# Patient Record
Sex: FEMALE | Race: WHITE | NOT HISPANIC OR LATINO | URBAN - METROPOLITAN AREA
[De-identification: names, ages, dates, MRNs, and addresses within clinical notes are randomized per-mention and may not be internally consistent; named-entity substitution may affect disease eponyms.]

---

## 2020-11-30 ENCOUNTER — INPATIENT (INPATIENT)
Facility: HOSPITAL | Age: 85
LOS: 2 days | Discharge: HOME | DRG: 86 | End: 2020-12-03
Attending: NEUROLOGICAL SURGERY | Admitting: NEUROLOGICAL SURGERY
Payer: MEDICARE

## 2020-11-30 VITALS
OXYGEN SATURATION: 97 % | HEIGHT: 62 IN | SYSTOLIC BLOOD PRESSURE: 142 MMHG | WEIGHT: 134.92 LBS | DIASTOLIC BLOOD PRESSURE: 89 MMHG | HEART RATE: 65 BPM | TEMPERATURE: 98 F | RESPIRATION RATE: 20 BRPM

## 2020-11-30 DIAGNOSIS — S06.5X9A TRAUMATIC SUBDURAL HEMORRHAGE WITH LOSS OF CONSCIOUSNESS OF UNSPECIFIED DURATION, INITIAL ENCOUNTER: ICD-10-CM

## 2020-11-30 LAB
ALBUMIN SERPL ELPH-MCNC: 3.8 G/DL — SIGNIFICANT CHANGE UP (ref 3.3–5.2)
ALP SERPL-CCNC: 94 U/L — SIGNIFICANT CHANGE UP (ref 40–120)
ALT FLD-CCNC: 24 U/L — SIGNIFICANT CHANGE UP
ANION GAP SERPL CALC-SCNC: 13 MMOL/L — SIGNIFICANT CHANGE UP (ref 5–17)
APTT BLD: 32.8 SEC — SIGNIFICANT CHANGE UP (ref 27.5–35.5)
APTT BLD: 34.1 SEC — SIGNIFICANT CHANGE UP (ref 27.5–35.5)
APTT BLD: 60.7 SEC — HIGH (ref 27.5–35.5)
AST SERPL-CCNC: 39 U/L — HIGH
BASOPHILS # BLD AUTO: 0.05 K/UL — SIGNIFICANT CHANGE UP (ref 0–0.2)
BASOPHILS NFR BLD AUTO: 0.9 % — SIGNIFICANT CHANGE UP (ref 0–2)
BILIRUB SERPL-MCNC: 0.5 MG/DL — SIGNIFICANT CHANGE UP (ref 0.4–2)
BLD GP AB SCN SERPL QL: SIGNIFICANT CHANGE UP
BLD GP AB SCN SERPL QL: SIGNIFICANT CHANGE UP
BUN SERPL-MCNC: 49 MG/DL — HIGH (ref 8–20)
CALCIUM SERPL-MCNC: 10.3 MG/DL — HIGH (ref 8.6–10.2)
CHLORIDE SERPL-SCNC: 105 MMOL/L — SIGNIFICANT CHANGE UP (ref 98–107)
CK SERPL-CCNC: 64 U/L — SIGNIFICANT CHANGE UP (ref 25–170)
CO2 SERPL-SCNC: 18 MMOL/L — LOW (ref 22–29)
CREAT SERPL-MCNC: 0.93 MG/DL — SIGNIFICANT CHANGE UP (ref 0.5–1.3)
EOSINOPHIL # BLD AUTO: 0.32 K/UL — SIGNIFICANT CHANGE UP (ref 0–0.5)
EOSINOPHIL NFR BLD AUTO: 6.1 % — HIGH (ref 0–6)
GIANT PLATELETS BLD QL SMEAR: PRESENT — SIGNIFICANT CHANGE UP
GLUCOSE BLDC GLUCOMTR-MCNC: 82 MG/DL — SIGNIFICANT CHANGE UP (ref 70–99)
GLUCOSE SERPL-MCNC: 91 MG/DL — SIGNIFICANT CHANGE UP (ref 70–99)
HCT VFR BLD CALC: 34.7 % — SIGNIFICANT CHANGE UP (ref 34.5–45)
HGB BLD-MCNC: 11.1 G/DL — LOW (ref 11.5–15.5)
INR BLD: 1.43 RATIO — HIGH (ref 0.88–1.16)
INR BLD: 1.47 RATIO — HIGH (ref 0.88–1.16)
INR BLD: 6.41 RATIO — CRITICAL HIGH (ref 0.88–1.16)
LYMPHOCYTES # BLD AUTO: 0.73 K/UL — LOW (ref 1–3.3)
LYMPHOCYTES # BLD AUTO: 13.9 % — SIGNIFICANT CHANGE UP (ref 13–44)
MANUAL SMEAR VERIFICATION: SIGNIFICANT CHANGE UP
MCHC RBC-ENTMCNC: 27.1 PG — SIGNIFICANT CHANGE UP (ref 27–34)
MCHC RBC-ENTMCNC: 32 GM/DL — SIGNIFICANT CHANGE UP (ref 32–36)
MCV RBC AUTO: 84.6 FL — SIGNIFICANT CHANGE UP (ref 80–100)
MONOCYTES # BLD AUTO: 0.46 K/UL — SIGNIFICANT CHANGE UP (ref 0–0.9)
MONOCYTES NFR BLD AUTO: 8.7 % — SIGNIFICANT CHANGE UP (ref 2–14)
NEUTROPHILS # BLD AUTO: 3.7 K/UL — SIGNIFICANT CHANGE UP (ref 1.8–7.4)
NEUTROPHILS NFR BLD AUTO: 70.4 % — SIGNIFICANT CHANGE UP (ref 43–77)
NRBC # BLD: 1 /100 — HIGH (ref 0–0)
PLAT MORPH BLD: NORMAL — SIGNIFICANT CHANGE UP
PLATELET # BLD AUTO: 121 K/UL — LOW (ref 150–400)
POTASSIUM SERPL-MCNC: 4.5 MMOL/L — SIGNIFICANT CHANGE UP (ref 3.5–5.3)
POTASSIUM SERPL-SCNC: 4.5 MMOL/L — SIGNIFICANT CHANGE UP (ref 3.5–5.3)
PROT SERPL-MCNC: 6.9 G/DL — SIGNIFICANT CHANGE UP (ref 6.6–8.7)
PROTHROM AB SERPL-ACNC: 16.3 SEC — HIGH (ref 10.6–13.6)
PROTHROM AB SERPL-ACNC: 16.7 SEC — HIGH (ref 10.6–13.6)
PROTHROM AB SERPL-ACNC: 68.1 SEC — HIGH (ref 10.6–13.6)
RAPID RVP RESULT: SIGNIFICANT CHANGE UP
RBC # BLD: 4.1 M/UL — SIGNIFICANT CHANGE UP (ref 3.8–5.2)
RBC # FLD: 16.7 % — HIGH (ref 10.3–14.5)
RBC BLD AUTO: NORMAL — SIGNIFICANT CHANGE UP
SARS-COV-2 RNA SPEC QL NAA+PROBE: SIGNIFICANT CHANGE UP
SODIUM SERPL-SCNC: 136 MMOL/L — SIGNIFICANT CHANGE UP (ref 135–145)
TROPONIN T SERPL-MCNC: 0.02 NG/ML — SIGNIFICANT CHANGE UP (ref 0–0.06)
WBC # BLD: 5.26 K/UL — SIGNIFICANT CHANGE UP (ref 3.8–10.5)
WBC # FLD AUTO: 5.26 K/UL — SIGNIFICANT CHANGE UP (ref 3.8–10.5)

## 2020-11-30 PROCEDURE — 93010 ELECTROCARDIOGRAM REPORT: CPT

## 2020-11-30 PROCEDURE — 71045 X-RAY EXAM CHEST 1 VIEW: CPT | Mod: 26

## 2020-11-30 PROCEDURE — 70450 CT HEAD/BRAIN W/O DYE: CPT | Mod: 26

## 2020-11-30 PROCEDURE — 93306 TTE W/DOPPLER COMPLETE: CPT | Mod: 26

## 2020-11-30 PROCEDURE — 72125 CT NECK SPINE W/O DYE: CPT | Mod: 26

## 2020-11-30 PROCEDURE — 70450 CT HEAD/BRAIN W/O DYE: CPT | Mod: 26,77

## 2020-11-30 PROCEDURE — 99222 1ST HOSP IP/OBS MODERATE 55: CPT

## 2020-11-30 PROCEDURE — 99291 CRITICAL CARE FIRST HOUR: CPT

## 2020-11-30 RX ORDER — AMLODIPINE BESYLATE 2.5 MG/1
1 TABLET ORAL
Qty: 0 | Refills: 0 | DISCHARGE

## 2020-11-30 RX ORDER — MONTELUKAST 4 MG/1
1 TABLET, CHEWABLE ORAL
Qty: 0 | Refills: 0 | DISCHARGE

## 2020-11-30 RX ORDER — ACETAMINOPHEN 500 MG
1000 TABLET ORAL ONCE
Refills: 0 | Status: COMPLETED | OUTPATIENT
Start: 2020-11-30 | End: 2020-11-30

## 2020-11-30 RX ORDER — PHYTONADIONE (VIT K1) 5 MG
10 TABLET ORAL ONCE
Refills: 0 | Status: COMPLETED | OUTPATIENT
Start: 2020-11-30 | End: 2020-11-30

## 2020-11-30 RX ORDER — TRAMADOL HYDROCHLORIDE 50 MG/1
25 TABLET ORAL EVERY 6 HOURS
Refills: 0 | Status: DISCONTINUED | OUTPATIENT
Start: 2020-11-30 | End: 2020-12-03

## 2020-11-30 RX ORDER — ACETAMINOPHEN 500 MG
650 TABLET ORAL EVERY 6 HOURS
Refills: 0 | Status: DISCONTINUED | OUTPATIENT
Start: 2020-11-30 | End: 2020-12-03

## 2020-11-30 RX ORDER — WARFARIN SODIUM 2.5 MG/1
1 TABLET ORAL
Qty: 0 | Refills: 0 | DISCHARGE

## 2020-11-30 RX ORDER — LISINOPRIL 2.5 MG/1
1 TABLET ORAL
Qty: 0 | Refills: 0 | DISCHARGE

## 2020-11-30 RX ORDER — METOPROLOL TARTRATE 50 MG
1 TABLET ORAL
Qty: 0 | Refills: 0 | DISCHARGE

## 2020-11-30 RX ORDER — ROSUVASTATIN CALCIUM 5 MG/1
1 TABLET ORAL
Qty: 0 | Refills: 0 | DISCHARGE

## 2020-11-30 RX ORDER — PROTHROMBIN COMPLEX CONCENTRATE (HUMAN) 25.5; 16.5; 24; 22; 22; 26 [IU]/ML; [IU]/ML; [IU]/ML; [IU]/ML; [IU]/ML; [IU]/ML
1500 POWDER, FOR SOLUTION INTRAVENOUS ONCE
Refills: 0 | Status: COMPLETED | OUTPATIENT
Start: 2020-11-30 | End: 2020-11-30

## 2020-11-30 RX ORDER — CHLORHEXIDINE GLUCONATE 213 G/1000ML
1 SOLUTION TOPICAL
Refills: 0 | Status: DISCONTINUED | OUTPATIENT
Start: 2020-11-30 | End: 2020-12-03

## 2020-11-30 RX ORDER — SODIUM CHLORIDE 9 MG/ML
1000 INJECTION INTRAMUSCULAR; INTRAVENOUS; SUBCUTANEOUS
Refills: 0 | Status: DISCONTINUED | OUTPATIENT
Start: 2020-11-30 | End: 2020-12-01

## 2020-11-30 RX ORDER — PANTOPRAZOLE SODIUM 20 MG/1
40 TABLET, DELAYED RELEASE ORAL DAILY
Refills: 0 | Status: DISCONTINUED | OUTPATIENT
Start: 2020-11-30 | End: 2020-12-01

## 2020-11-30 RX ORDER — LEVOTHYROXINE SODIUM 125 MCG
1 TABLET ORAL
Qty: 0 | Refills: 0 | DISCHARGE

## 2020-11-30 RX ORDER — ACETAMINOPHEN 500 MG
975 TABLET ORAL ONCE
Refills: 0 | Status: DISCONTINUED | OUTPATIENT
Start: 2020-11-30 | End: 2020-11-30

## 2020-11-30 RX ORDER — LEVETIRACETAM 250 MG/1
500 TABLET, FILM COATED ORAL EVERY 12 HOURS
Refills: 0 | Status: DISCONTINUED | OUTPATIENT
Start: 2020-11-30 | End: 2020-12-01

## 2020-11-30 RX ADMIN — CHLORHEXIDINE GLUCONATE 1 APPLICATION(S): 213 SOLUTION TOPICAL at 16:15

## 2020-11-30 RX ADMIN — TRAMADOL HYDROCHLORIDE 25 MILLIGRAM(S): 50 TABLET ORAL at 14:08

## 2020-11-30 RX ADMIN — Medication 1000 MILLIGRAM(S): at 12:05

## 2020-11-30 RX ADMIN — Medication 102 MILLIGRAM(S): at 13:18

## 2020-11-30 RX ADMIN — LEVETIRACETAM 420 MILLIGRAM(S): 250 TABLET, FILM COATED ORAL at 13:18

## 2020-11-30 RX ADMIN — PROTHROMBIN COMPLEX CONCENTRATE (HUMAN) 400 INTERNATIONAL UNIT(S): 25.5; 16.5; 24; 22; 22; 26 POWDER, FOR SOLUTION INTRAVENOUS at 11:12

## 2020-11-30 RX ADMIN — SODIUM CHLORIDE 50 MILLILITER(S): 9 INJECTION INTRAMUSCULAR; INTRAVENOUS; SUBCUTANEOUS at 16:15

## 2020-11-30 RX ADMIN — PANTOPRAZOLE SODIUM 40 MILLIGRAM(S): 20 TABLET, DELAYED RELEASE ORAL at 16:14

## 2020-11-30 RX ADMIN — Medication 400 MILLIGRAM(S): at 11:12

## 2020-11-30 RX ADMIN — TRAMADOL HYDROCHLORIDE 25 MILLIGRAM(S): 50 TABLET ORAL at 16:11

## 2020-11-30 RX ADMIN — PROTHROMBIN COMPLEX CONCENTRATE (HUMAN) 1500 INTERNATIONAL UNIT(S): 25.5; 16.5; 24; 22; 22; 26 POWDER, FOR SOLUTION INTRAVENOUS at 12:05

## 2020-11-30 NOTE — PHARMACOTHERAPY INTERVENTION NOTE - COMMENTS
Pt with INR 6.4, on warfarin. Given PCC 1,500 units for supratherapeutic INR. Recommended addition of phytonadione to prevent re-elevation of INR

## 2020-11-30 NOTE — H&P ADULT - NSHPREVIEWOFSYSTEMS_GEN_ALL_CORE
REVIEW OF SYSTEMS: [ ] Unable to Assess due to neurologic exam   [x ] All ROS addressed below are non-contributory, except:  Neuro: [x ] Headache [ ] Back pain [ ] Numbness [ ] Weakness [ ] Ataxia [ ] Dizziness [ ] Aphasia [ ] Dysarthria [ ] Visual disturbance  Resp: [ ] Shortness of breath/dyspnea, [ ] Orthopnea [ ] Cough  CV: [ ] Chest pain [ ] Palpitation [ ] Lightheadedness [ ] Syncope  Renal: [ ] Thirst [ ] Edema  GI: [ ] Nausea [ ] Emesis [ ] Abdominal pain [ ] Constipation [ ] Diarrhea  Hem: [ ] Hematemesis [ ] bright red blood per rectum  ID: [ ] Fever [ ] Chills [ ] Dysuria  ENT: [ ] Rhinorrhea

## 2020-11-30 NOTE — ED PROVIDER NOTE - OBJECTIVE STATEMENT
89 y/o F with hx of afib on coumadin presenting today after unwitnessed fall today at home. Pt states that she slipped and fell backwards, hitting the back of her head. Unknown loc, son at home heard the pt fall and came up immediately to find the pt conscious on the floor. Lac noted to back of head, bleeding controlled by EMS. Pt denies any chest pain, dyspnea, fevers, n/v/d, abdominal pain, dysuria, cough, congestion, sore throat, neck pain, back pain, weakness, numbness, tingling, dizziness, syncope, or other complaint. 89 y/o F with hx of HTN, HLD, and afib on coumadin presenting today after unwitnessed fall today at home. Pt states that she slipped and fell backwards, hitting the back of her head. Unknown loc, son at home heard the pt fall and came up immediately to find the pt conscious on the floor. Lac noted to back of head, bleeding controlled by EMS. Pt denies any chest pain, dyspnea, fevers, n/v/d, abdominal pain, dysuria, cough, congestion, sore throat, neck pain, back pain, weakness, numbness, tingling, dizziness, syncope, or other complaint.

## 2020-11-30 NOTE — PHARMACOTHERAPY INTERVENTION NOTE - COMMENTS
Called pharmacy to obtain medication list. Pt on warfarin 5 mG qday, confirmed last dose was 11/29 in the morning

## 2020-11-30 NOTE — CONSULT NOTE ADULT - SUBJECTIVE AND OBJECTIVE BOX
TRAUMA SERVICE - CONSULT NOTE  --------------------------------------------------------------------------------------------    TRAUMA ACTIVATION LEVEL: Consult    MECHANISM OF INJURY:      [x] Blunt  	[] MVC	[x] Fall	[] Pedestrian Struck	[] Motorcycle accident      [] Penetrating  	[] Gun Shot Wound 		[] Stab Wound    GCS:15 	E: 4	V: 5	M: 6    HPI: 89yo F with PMH of HTN, HLD, and A.fib on Coumadin presents after mechanical fall at home. Patient explains she was walking to other side of bed when she slipped and fell backwards hitting head. No loss of consciousness. Found by son who was at home and heard fall. She only complains of headache. No pain elsewhere in body. Denies fever, chest pain, shortness of breath, lightheadedness, dizziness, or changes in vision. Of note patient reported that her last INR check earlier this week was 5 and she had just stopped taking coumadin this morning.     Primary Survey:    A - airway intact  B - bilateral breath sounds and good chest rise  C - initial BP  BP: 127/54 (11-30-20 @ 19:00) *** , HR HR: 60 (11-30-20 @ 19:00) *** , palpable pulses in all extremities  D - GCS 15 on arrival      ROS: 10-system review is otherwise negative except HPI above.      PAST MEDICAL & SURGICAL HISTORY:    FAMILY HISTORY:    [x] Family history not pertinent as reviewed with the patient and family    SOCIAL HISTORY: Denies smoking, drinking alcohol, or use of illicit drugs.     ALLERGIES: No Known Allergies      HOME MEDICATIONS:   amLODIPine 5 mg oral tablet: 1 tab(s) orally once a day (30 Nov 2020 14:13)  hydroCHLOROthiazide 12.5 mg oral capsule: 1 cap(s) orally once a day (30 Nov 2020 14:13)  levothyroxine 50 mcg (0.05 mg) oral tablet: 1 tab(s) orally once a day (30 Nov 2020 14:13)  lisinopril 40 mg oral tablet: 1 tab(s) orally once a day (30 Nov 2020 14:13)  metoprolol succinate 50 mg oral tablet, extended release: 1 tab(s) orally once a day (30 Nov 2020 14:13)  montelukast 10 mg oral tablet: 1 tab(s) orally once a day (30 Nov 2020 14:13)  rosuvastatin 20 mg oral tablet: 1 tab(s) orally once a day (30 Nov 2020 14:13)  warfarin 5 mg oral tablet: 1 tab(s) orally once a day (30 Nov 2020 14:13)      CURRENT MEDICATIONS  MEDICATIONS (STANDING): levETIRAcetam  IVPB 500 milliGRAM(s) IV Intermittent every 12 hours  pantoprazole  Injectable 40 milliGRAM(s) IV Push daily  sodium chloride 0.9%. 1000 milliLiter(s) IV Continuous <Continuous>    MEDICATIONS (PRN):acetaminophen   Tablet .. 650 milliGRAM(s) Oral every 6 hours PRN Mild Pain (1 - 3)  traMADol 25 milliGRAM(s) Oral every 6 hours PRN Severe Pain (7 - 10)    --------------------------------------------------------------------------------------------    Vitals:   T(C): 36.4 (11-30-20 @ 16:00), Max: 36.8 (11-30-20 @ 09:57)  HR: 60 (11-30-20 @ 19:00) (55 - 70)  BP: 127/54 (11-30-20 @ 19:00) (121/60 - 142/89)  RR: 18 (11-30-20 @ 19:00) (16 - 20)  SpO2: 96% (11-30-20 @ 19:00) (96% - 100%)  CAPILLARY BLOOD GLUCOSE      POCT Blood Glucose.: 82 mg/dL (30 Nov 2020 19:12)    CAPILLARY BLOOD GLUCOSE      POCT Blood Glucose.: 82 mg/dL (30 Nov 2020 19:12)      11-30 @ 07:01  -  11-30 @ 21:11  --------------------------------------------------------  IN:    sodium chloride 0.9%: 200 mL  Total IN: 200 mL    OUT:  Total OUT: 0 mL    Total NET: 200 mL        Height (cm): 154.9 (11-30 @ 15:32)  Weight (kg): 64.1 (11-30 @ 15:32)  BMI (kg/m2): 26.7 (11-30 @ 15:32)  BSA (m2): 1.63 (11-30 @ 15:32)    PHYSICAL EXAM:   General: NAD, pleasant, follows commands, no focal deficits.   HEENT: Normocephalic, EOMI, Small 2 cm laceration on posterior scalp. No active bleeding, superficial and without need for repair. No facial tenderness. No blood in oral cavity.   Neck: Soft, midline trachea. No tenderness.   Chest: No chest wall tenderness.   Cardiac: S1, S2, RRR  Respiratory: unlabored, equal chest rise.   Abdomen: Soft, non-distended, non-tender.   Pelvis: Stable  Ext: palp radial b/l UE, b/l DP palp in Lower Extrem. No external lesions or signs of trauma.   Back: no TTP, no palpable runoff/stepoff/deformity      --------------------------------------------------------------------------------------------    LABS  CBC (11-30 @ 10:48)                              11.1<L>                         5.26    )----------------(  121<L>     70.4  % Neutrophils, 13.9  % Lymphocytes, ANC: 3.70                                34.7      BMP (11-30 @ 10:48)             136     |  105     |  49.0<H>		Ca++ --      Ca 10.3<H>             ---------------------------------( 91    		Mg --                 4.5     |  18.0<L>  |  0.93  			Ph --        LFTs (11-30 @ 10:48)      TPro 6.9 / Alb 3.8 / TBili 0.5 / DBili -- / AST 39<H> / ALT 24 / AlkPhos 94    Coags (11-30 @ 13:37)  aPTT 34.1 / INR 1.47<H> / PT 16.7<H>  Coags (11-30 @ 10:48)  aPTT 60.7<H> / INR 6.41<HH> / PT 68.1<H>    Cardiac Markers (11-30 @ 14:45)     HSTrop: -- / CKMB: -- / CK: 64        --------------------------------------------------------------------------------------------    IMAGING  CT Head and C-Spine:  IMPRESSION:    CT head: Acute parafalcine subdural hematoma measuring 9 mm, extending along the left tentorium. Acute bilateral frontal subdural hematomas measuring up to 5 mm. Scattered small left frontal parietal subarachnoid hemorrhage. No midline shift, hydrocephalus, or effacement of basal cisterns. No evidence of displaced calvarial fracture.    CT cervical spine:  1. No evidence for acute displaced fracture or malalignment.  2. Bilateral thyroid nodules measuring up to 2.3 cm on the right. Consider nonemergent thyroid ultrasound.  3. 5 mm left upper lobe nodule. Consider nonemergent CT chest.    These findings were discussed with Dr. Em at 11/30/2020 10:12 AM by Dr. Yasmani Ramirez with read back confirmation.      YASMANI RAMIREZ MD; Attending Radiologist  This document has been electronically signed. Nov 30 2020 10:36AM      --------------------------------------------------------------------------------------------

## 2020-11-30 NOTE — H&P ADULT - HISTORY OF PRESENT ILLNESS
89 y/o F with hx of HTN, HLD, and Afib on Coumadin presenting today after unwitnessed fall today at home. Pt states that she slipped and fell backwards, hitting the back of her head. Unknown loc, son at home heard the pt fall and came up immediately to find the pt conscious on the floor. Small Laceration noted to back of head, bleeding controlled by EMS. Currently patient complaining of Lt sided headache. Pt denies any chest pain, dyspnea, nausea/vomiting, neck pain, back pain, weakness, numbness, tingling, dizziness.

## 2020-11-30 NOTE — CONSULT NOTE ADULT - ATTENDING COMMENTS
".Discharge Instructions: After Your Surgery/Procedure  Youve just had surgery. During surgery you were given medicine called anesthesia to keep you relaxed and free of pain. After surgery you may have some pain or nausea. This is common. Here are some tips for feeling better and getting well after surgery.     Stay on schedule with your medication.   Going home  Your doctor or nurse will show you how to take care of yourself when you go home. He or she will also answer your questions. Have an adult family member or friend drive you home.      For your safety we recommend these precaution for the first 24 hours after your procedure:  · Do not drive or use heavy equipment.  · Do not make important decisions or sign legal papers.  · Do not drink alcohol.  · Have someone stay with you, if needed. He or she can watch for problems and help keep you safe.  · Your concentration, balance, coordination, and judgement may be impaired for many hours after anesthesia.  Use caution when ambulating or standing up.     · You may feel weak and "washed out" after anesthesia and surgery.      Subtle residual effects of general anesthesia or sedation with regional / local anesthesia can last more than 24 hours.  Rest for the remainder of the day or longer if your Doctor/Surgeon has advised you to do so.  Although you may feel normal within the first 24 hours, your reflexes and mental ability may be impaired without you realizing it.  You may feel dizzy, lightheaded or sleepy for 24 hours or longer.      Be sure to go to all follow-up visits with your doctor. And rest after your surgery for as long as your doctor tells you to.  Coping with pain  If you have pain after surgery, pain medicine will help you feel better. Take it as told, before pain becomes severe. Also, ask your doctor or pharmacist about other ways to control pain. This might be with heat, ice, or relaxation. And follow any other instructions your surgeon or nurse gives " you.  Tips for taking pain medicine  To get the best relief possible, remember these points:  · Pain medicines can upset your stomach. Taking them with a little food may help.  · Most pain relievers taken by mouth need at least 20 to 30 minutes to start to work.  · Taking medicine on a schedule can help you remember to take it. Try to time your medicine so that you can take it before starting an activity. This might be before you get dressed, go for a walk, or sit down for dinner.  · Constipation is a common side effect of pain medicines. Call your doctor before taking any medicines such as laxatives or stool softeners to help ease constipation. Also ask if you should skip any foods. Drinking lots of fluids and eating foods such as fruits and vegetables that are high in fiber can also help. Remember, do not take laxatives unless your surgeon has prescribed them.  · Drinking alcohol and taking pain medicine can cause dizziness and slow your breathing. It can even be deadly. Do not drink alcohol while taking pain medicine.  · Pain medicine can make you react more slowly to things. Do not drive or run machinery while taking pain medicine.  Your health care provider may tell you to take acetaminophen to help ease your pain. Ask him or her how much you are supposed to take each day. Acetaminophen or other pain relievers may interact with your prescription medicines or other over-the-counter (OTC) drugs. Some prescription medicines have acetaminophen and other ingredients. Using both prescription and OTC acetaminophen for pain can cause you to overdose. Read the labels on your OTC medicines with care. This will help you to clearly know the list of ingredients, how much to take, and any warnings. It may also help you not take too much acetaminophen. If you have questions or do not understand the information, ask your pharmacist or health care provider to explain it to you before you take the OTC medicine.  Managing  nausea  Some people have an upset stomach after surgery. This is often because of anesthesia, pain, or pain medicine, or the stress of surgery. These tips will help you handle nausea and eat healthy foods as you get better. If you were on a special food plan before surgery, ask your doctor if you should follow it while you get better. These tips may help:  · Do not push yourself to eat. Your body will tell you when to eat and how much.  · Start off with clear liquids and soup. They are easier to digest.  · Next try semi-solid foods, such as mashed potatoes, applesauce, and gelatin, as you feel ready.  · Slowly move to solid foods. Dont eat fatty, rich, or spicy foods at first.  · Do not force yourself to have 3 large meals a day. Instead eat smaller amounts more often.  · Take pain medicines with a small amount of solid food, such as crackers or toast, to avoid nausea.     Call your surgeon if  · You still have pain an hour after taking medicine. The medicine may not be strong enough.  · You feel too sleepy, dizzy, or groggy. The medicine may be too strong.  · You have side effects like nausea, vomiting, or skin changes, such as rash, itching, or hives.       If you have obstructive sleep apnea  You were given anesthesia medicine during surgery to keep you comfortable and free of pain. After surgery, you may have more apnea spells because of this medicine and other medicines you were given. The spells may last longer than usual.   At home:  · Keep using the continuous positive airway pressure (CPAP) device when you sleep. Unless your health care provider tells you not to, use it when you sleep, day or night. CPAP is a common device used to treat obstructive sleep apnea.  · Talk with your provider before taking any pain medicine, muscle relaxants, or sedatives. Your provider will tell you about the possible dangers of taking these medicines.  © 5734-5873 [employer]. [address] [place] [address]  [place] [address]. All rights reserved. This information is not intended as a substitute for professional medical care. Always follow your healthcare professional's instructions.     NSGY Attg:    see above    patient seen and examined by PA staff    imaging reviewed    agree with exam and plan as above

## 2020-11-30 NOTE — H&P ADULT - NSHPLABSRESULTS_GEN_ALL_CORE
LABS:  Na: 136 (11-30 @ 10:48)  K: 4.5 (11-30 @ 10:48)  Cl: 105 (11-30 @ 10:48)  CO2: 18.0 (11-30 @ 10:48)  BUN: 49.0 (11-30 @ 10:48)  Cr: 0.93 (11-30 @ 10:48)  Glu: 91(11-30 @ 10:48)    Hgb: 11.1 (11-30 @ 10:48)  Hct: 34.7 (11-30 @ 10:48)  WBC: 5.26 (11-30 @ 10:48)  Plt: 121 (11-30 @ 10:48)    INR: 6.41 11-30-20 @ 10:48  PTT: 60.7 11-30-20 @ 10:48    CT cervical spine:    Some images are degraded by motion.    Anterolisthesis at C3-C4, C7-T1, and T1-T2. Vertebral body heights are within normal limits. Nonfused posterior C1 arch. Multilevel intervertebral disc height loss, degenerative endplate changes, disc osteophyte complexes, and facet and uncovertebral arthropathy, contributing to spinal canal and neuroforaminal narrowing.    There is no prevertebral soft tissue swelling. Calcified plaque in the aortic arch and at the bilateral carotid bifurcations. Bilateral thyroid nodules measuring up to 2.3 cm on the right. Biapical scarring. 5 mm left upper lobe nodule (3:156).      IMPRESSION:    CT head: Acute parafalcine subdural hematoma measuring 9 mm, extending along the left tentorium. Acute bilateral frontal subdural hematomas measuring up to 5 mm. Scattered small left frontal parietal subarachnoid hemorrhage. No midline shift, hydrocephalus, or effacement of basal cisterns. No evidence of displaced calvarial fracture.    CT cervical spine:  1.  No evidence for acute displaced fracture or malalignment.  2.  Bilateral thyroid nodules measuring up to 2.3 cm on the right. Consider nonemergent thyroid ultrasound.  3.  5 mm left upper lobe nodule. Consider nonemergent CT chest.

## 2020-11-30 NOTE — ED PROVIDER NOTE - PHYSICAL EXAMINATION
Gen: no acute distress  Head: normocephalic, 2cm lac noted to posterior skull, no active bleeding  Lung: CTAB, no respiratory distress, no wheezing, rales, rhonchi  CV: normal s1/s2, rrr,   Abd: soft, non-tender, non-distended  MSK: No edema, no visible deformities, full range of motion in all 4 extremities  Neuro: No focal neurologic deficits  Skin: No rash   Psych: normal affect

## 2020-11-30 NOTE — CONSULT NOTE ADULT - ATTENDING COMMENTS
The patient was seen and examined  Details per the resident's consult note  This is a 89-year old woman who sustained a ground level fall  The patient was worked up by the ED and found to have an ICH  No other injuries identified  The patient was supratherapeutic from warfarin    CXR:  No PTX/ZAIN  PXR:  No fracture    Impression:  S/P fall  ICH  Coagulopathy    Plan:  Correct coagulopathy - PCC  Neurosurgery

## 2020-11-30 NOTE — ED PROVIDER NOTE - ATTENDING CONTRIBUTION TO CARE
I personally saw the patient with the resident, and completed the key components of the history and physical exam. I then discussed the management plan with the resident.      s/p fall on coumadin with head injury; denies loc; pt with bleeding from occiput of head; denies any neck pain; pe awake alert in nad heent- dresssing with dry blood; superficial abrasion of head; no stepoff; neck supple with collar ; no midline tenderness chest nontender  s1 s2 lungs clear abd soft nontender neuro nonfocal  dx head injury; ct head, trauma consult; neurosurg consult;

## 2020-11-30 NOTE — ED PROVIDER NOTE - PROGRESS NOTE DETAILS
CT reads noted. Trauma cx and nsx consults placed. - Murray Em, PGY-2 Nsx down to evaluate pt, requesting admit to NSICU. Reviewed all results with pt as well as plans for admission. Pt is comfortable with plan for admission. Questions answered. - Murray Em, PGY-2

## 2020-11-30 NOTE — CONSULT NOTE ADULT - SUBJECTIVE AND OBJECTIVE BOX
89 y/o F with hx of HTN, HLD, and Afib on Coumadin presenting today after unwitnessed fall today at home. Pt states that she slipped and fell backwards, hitting the back of her head. Unknown loc, son at home heard the pt fall and came up immediately to find the pt conscious on the floor. Small Laceration noted to back of head, bleeding controlled by EMS. Currently patient complaining of Lt sided headache. Pt denies any chest pain, dyspnea, nausea/vomiting, neck pain, back pain, weakness, numbness, tingling, dizziness.     VITAL SIGNS:  T(C): 36.7 (30 Nov 2020 11:12), Max: 36.8 (30 Nov 2020 09:57)  T(F): 98 (30 Nov 2020 11:12), Max: 98.2 (30 Nov 2020 09:57)  HR: 68 (30 Nov 2020 11:12) (65 - 70)  BP: 138/70 (30 Nov 2020 11:12) (138/70 - 142/89)  BP(mean): --  RR: 16 (30 Nov 2020 11:12) (16 - 20)  SpO2: 100% (30 Nov 2020 11:12) (97% - 100%)    ALLERGIES:  No Known Allergies      LABS:                     11.1   5.26  )-----------( 121      ( 30 Nov 2020 10:48 )             34.7       11-30    136  |  105  |  49.0<H>  ----------------------------<  91  4.5   |  18.0<L>  |  0.93    Ca    10.3<H>      30 Nov 2020 10:48    TPro  6.9  /  Alb  3.8  /  TBili  0.5  /  DBili  x   /  AST  39<H>  /  ALT  24  /  AlkPhos  94  11-30    PHYSICAL EXAM:   Patient seen and examined in ED. In NAD, complaining of Left sided headache.  	Head: normocephalic, 2cm lac noted to posterior skull, no active bleeding  	Neuro: Awake, alert and oriented to self and place- not date.  +[place].  Speech clear. Pupils 3mm reactive B/L.  EOMI.  No facial noted.  Follows simple commands, ZARATE 5/5.  No drift noted.   	Lung: CTAB, no respiratory distress, no wheezing, rales, rhonchi  	CV: normal s1/s2, rrr,   	Abd: soft, non-tender, non-distended    RADIOLOGY:  CT Head No Cont (11.30.20 @ 10:17)  CT head: Acute parafalcine subdural hematoma measuring 9 mm, extending along the left tentorium. Acute bilateral frontal subdural hematomas measuring up to 5 mm. Scattered small left frontal parietal subarachnoid hemorrhage. No midline shift, hydrocephalus, or effacement of basal cisterns. No evidence of displaced calvarial fracture.    CT cervical spine:  1.  No evidence for acute displaced fracture or malalignment.  2.  Bilateral thyroid nodules measuring up to 2.3 cm on the right. Consider nonemergent thyroid ultrasound.

## 2020-11-30 NOTE — H&P ADULT - ASSESSMENT
A/P:  89 year ole woman tripped and hit her head, presenting with acute   CT head: Acute parafalcine subdural hematoma measuring 9 mm, extending along the left tentorium. Acute bilateral frontal subdural hematomas measuring up to 5 mm. Scattered small left frontal parietal subarachnoid hemorrhage  while on coumadin, INR  6.2    Neuro: neuro checks q 1 hr, CT head IN 6 HOURS , keppra 500 mg BID for seizure prophylaxis,  CERVICAL COLLAR CLEARED clinically and by CT head  received 1500 PCC , which is likely underdosed per weight and pre-PCC INR, if INR now is high, will give another 25548 PCC  vIT k 10 MG IV once  INR q 6 hrs   Respiratory: RA  CV: Atrial fibrillation, will resume her rate control medication, TTE, -140 mmhg  Endocrine: finger sticks q6hrs, ISS , keep sugar 120-180 mmhg   Heme/Onc: INR <1.5, Plt>100 , s/p PCC, INR q 6 hrs for 24 hrs           DVT ppx: SCD, contraindicated to start anticoagulant as she is PBD0   Renal: NS 75 ml/hr  ID: afebrile  GI: NPO, protonix, NG, colace   Social/Family: updated at bedside  Discharge planning: ICU    Code Status: [x] Full Code [] DNR [] DNI [] Goals of Care:   Disposition: [x] ICU [] Stroke Unit [] RCU []PCU []Floor [] Discharge Home     Patient at high risk for neurologic deterioration, seizures, critical care time, excluding procedures: 40 minutes  Patient condition is critical, she has very poor exam.     40 minutes critical time  patient is at increased risk of hematoma expansion and death

## 2020-11-30 NOTE — ED ADULT NURSE NOTE - OBJECTIVE STATEMENT
pt alert oriented lives alone pt got up and walked around and slipped on floor fell back and hit head on floor denies LOC but c/o headache per pt remembers whole thing pt niece was there on way to leaving to go to work called EMS [place]  per pt nephew lives downstairs in house

## 2020-11-30 NOTE — ED ADULT NURSE NOTE - NSIMPLEMENTINTERV_GEN_ALL_ED
Implemented All Universal Safety Interventions:  [place] to call system. Call bell, personal items and telephone within reach. Instruct patient to call for assistance. Room bathroom lighting operational. Non-slip footwear when patient is off stretcher. Physically safe environment: no spills, clutter or unnecessary equipment. Stretcher in lowest position, wheels locked, appropriate side rails in place.

## 2020-11-30 NOTE — ED PROVIDER NOTE - CLINICAL SUMMARY MEDICAL DECISION MAKING FREE TEXT BOX
Pt presenting as fall from home w/ +head injury on coumadin. Priority CT head/c-spine ordered. WIll order labs, t/s, coags, cxr, reeval.

## 2020-11-30 NOTE — H&P ADULT - NSHPPHYSICALEXAM_GEN_ALL_CORE
GA: NAD  Scalp: small scalp laceration   HEENT: atraumatic  Neuro: BRANDI, intact EOM, awake, alert, oriented x 3, no cervical tenderness on palpation and on full range of motion, muscle power 5/5 all over except slightly weaker on the left side   Heart: irregular, [id number]   lungs: good breath sounds bilaterally  Abd: soft, nontender  ext: no edema

## 2020-11-30 NOTE — ED ADULT TRIAGE NOTE - CHIEF COMPLAINT QUOTE
Mechanical fall at home, hitting back of head onto floor, laceration noted to back of head, wrapped with gauze. pt denies LOC, on coumadin, MD hill at bedside, priority CT called.

## 2020-11-30 NOTE — CONSULT NOTE ADULT - ASSESSMENT
Patient is a 89y old f with HTN and A.fib on coumadin who presents s/p mechanical fall with positive head strike and minor laceration to posterior scalp as well as a supra-therapeutic  INR. Found on CT with L parafalcine and b/l frontal SDH, and scattered small left SAH. Posterior scalp laceration without bleeding or need for repair. She denies pain elsewhere in body and physical exam without findings of other injuries. Hemodynamically stable and neurologically intact.     SDH / SAH  - Neurologically intact.  - Admit to Neurosurgery / Neurosx ICU    Posterior Scalp Lac  - Without bleeding, superficial, No need for suture or staple repair.   - Consider bacitracin     S/p Mechanical Fall  - No other injuries requiring general surgical intervention identified  - Tertiary trauma exam in AM      Plan discussed with Dr. Draper who agrees.

## 2020-12-01 LAB
A1C WITH ESTIMATED AVERAGE GLUCOSE RESULT: 5.4 % — SIGNIFICANT CHANGE UP (ref 4–5.6)
ANION GAP SERPL CALC-SCNC: 11 MMOL/L — SIGNIFICANT CHANGE UP (ref 5–17)
APTT BLD: 31.8 SEC — SIGNIFICANT CHANGE UP (ref 27.5–35.5)
BUN SERPL-MCNC: 34 MG/DL — HIGH (ref 8–20)
CALCIUM SERPL-MCNC: 9.5 MG/DL — SIGNIFICANT CHANGE UP (ref 8.6–10.2)
CHLORIDE SERPL-SCNC: 110 MMOL/L — HIGH (ref 98–107)
CHOLEST SERPL-MCNC: 125 MG/DL — SIGNIFICANT CHANGE UP
CO2 SERPL-SCNC: 18 MMOL/L — LOW (ref 22–29)
CREAT SERPL-MCNC: 0.74 MG/DL — SIGNIFICANT CHANGE UP (ref 0.5–1.3)
ESTIMATED AVERAGE GLUCOSE: 108 MG/DL — SIGNIFICANT CHANGE UP (ref 68–114)
GLUCOSE SERPL-MCNC: 82 MG/DL — SIGNIFICANT CHANGE UP (ref 70–99)
HCT VFR BLD CALC: 31.9 % — LOW (ref 34.5–45)
HDLC SERPL-MCNC: 56 MG/DL — SIGNIFICANT CHANGE UP
HGB BLD-MCNC: 10.3 G/DL — LOW (ref 11.5–15.5)
INR BLD: 1.25 RATIO — HIGH (ref 0.88–1.16)
LIPID PNL WITH DIRECT LDL SERPL: 56 MG/DL — SIGNIFICANT CHANGE UP
MAGNESIUM SERPL-MCNC: 1.8 MG/DL — SIGNIFICANT CHANGE UP (ref 1.6–2.6)
MCHC RBC-ENTMCNC: 28 PG — SIGNIFICANT CHANGE UP (ref 27–34)
MCHC RBC-ENTMCNC: 32.3 GM/DL — SIGNIFICANT CHANGE UP (ref 32–36)
MCV RBC AUTO: 86.7 FL — SIGNIFICANT CHANGE UP (ref 80–100)
NON HDL CHOLESTEROL: 69 MG/DL — SIGNIFICANT CHANGE UP
PHOSPHATE SERPL-MCNC: 3 MG/DL — SIGNIFICANT CHANGE UP (ref 2.4–4.7)
PLATELET # BLD AUTO: 105 K/UL — LOW (ref 150–400)
POTASSIUM SERPL-MCNC: 4.1 MMOL/L — SIGNIFICANT CHANGE UP (ref 3.5–5.3)
POTASSIUM SERPL-SCNC: 4.1 MMOL/L — SIGNIFICANT CHANGE UP (ref 3.5–5.3)
PROTHROM AB SERPL-ACNC: 14.4 SEC — HIGH (ref 10.6–13.6)
RBC # BLD: 3.68 M/UL — LOW (ref 3.8–5.2)
RBC # FLD: 17 % — HIGH (ref 10.3–14.5)
SARS-COV-2 IGG SERPL QL IA: NEGATIVE — SIGNIFICANT CHANGE UP
SARS-COV-2 IGM SERPL IA-ACNC: 0.13 INDEX — SIGNIFICANT CHANGE UP
SODIUM SERPL-SCNC: 139 MMOL/L — SIGNIFICANT CHANGE UP (ref 135–145)
TRIGL SERPL-MCNC: 64 MG/DL — SIGNIFICANT CHANGE UP
TSH SERPL-MCNC: 4.63 UIU/ML — HIGH (ref 0.27–4.2)
WBC # BLD: 5.87 K/UL — SIGNIFICANT CHANGE UP (ref 3.8–10.5)
WBC # FLD AUTO: 5.87 K/UL — SIGNIFICANT CHANGE UP (ref 3.8–10.5)

## 2020-12-01 PROCEDURE — 99231 SBSQ HOSP IP/OBS SF/LOW 25: CPT

## 2020-12-01 PROCEDURE — 99232 SBSQ HOSP IP/OBS MODERATE 35: CPT

## 2020-12-01 PROCEDURE — 70450 CT HEAD/BRAIN W/O DYE: CPT | Mod: 26

## 2020-12-01 PROCEDURE — 99233 SBSQ HOSP IP/OBS HIGH 50: CPT

## 2020-12-01 RX ORDER — LEVETIRACETAM 250 MG/1
500 TABLET, FILM COATED ORAL
Refills: 0 | Status: DISCONTINUED | OUTPATIENT
Start: 2020-12-01 | End: 2020-12-03

## 2020-12-01 RX ORDER — AMLODIPINE BESYLATE 2.5 MG/1
5 TABLET ORAL DAILY
Refills: 0 | Status: DISCONTINUED | OUTPATIENT
Start: 2020-12-01 | End: 2020-12-03

## 2020-12-01 RX ORDER — POLYETHYLENE GLYCOL 3350 17 G/17G
17 POWDER, FOR SOLUTION ORAL DAILY
Refills: 0 | Status: DISCONTINUED | OUTPATIENT
Start: 2020-12-01 | End: 2020-12-03

## 2020-12-01 RX ORDER — METOPROLOL TARTRATE 50 MG
25 TABLET ORAL
Refills: 0 | Status: DISCONTINUED | OUTPATIENT
Start: 2020-12-01 | End: 2020-12-03

## 2020-12-01 RX ORDER — ATORVASTATIN CALCIUM 80 MG/1
80 TABLET, FILM COATED ORAL AT BEDTIME
Refills: 0 | Status: DISCONTINUED | OUTPATIENT
Start: 2020-12-01 | End: 2020-12-03

## 2020-12-01 RX ORDER — MAGNESIUM SULFATE 500 MG/ML
2 VIAL (ML) INJECTION ONCE
Refills: 0 | Status: COMPLETED | OUTPATIENT
Start: 2020-12-01 | End: 2020-12-01

## 2020-12-01 RX ORDER — INFLUENZA VIRUS VACCINE 15; 15; 15; 15 UG/.5ML; UG/.5ML; UG/.5ML; UG/.5ML
0.5 SUSPENSION INTRAMUSCULAR ONCE
Refills: 0 | Status: DISCONTINUED | OUTPATIENT
Start: 2020-12-01 | End: 2020-12-03

## 2020-12-01 RX ORDER — LISINOPRIL 2.5 MG/1
40 TABLET ORAL DAILY
Refills: 0 | Status: DISCONTINUED | OUTPATIENT
Start: 2020-12-01 | End: 2020-12-01

## 2020-12-01 RX ORDER — LEVOTHYROXINE SODIUM 125 MCG
50 TABLET ORAL DAILY
Refills: 0 | Status: DISCONTINUED | OUTPATIENT
Start: 2020-12-01 | End: 2020-12-03

## 2020-12-01 RX ORDER — MONTELUKAST 4 MG/1
10 TABLET, CHEWABLE ORAL DAILY
Refills: 0 | Status: DISCONTINUED | OUTPATIENT
Start: 2020-12-01 | End: 2020-12-03

## 2020-12-01 RX ORDER — SENNA PLUS 8.6 MG/1
2 TABLET ORAL AT BEDTIME
Refills: 0 | Status: DISCONTINUED | OUTPATIENT
Start: 2020-12-01 | End: 2020-12-03

## 2020-12-01 RX ADMIN — ATORVASTATIN CALCIUM 80 MILLIGRAM(S): 80 TABLET, FILM COATED ORAL at 21:56

## 2020-12-01 RX ADMIN — Medication 650 MILLIGRAM(S): at 07:15

## 2020-12-01 RX ADMIN — SENNA PLUS 2 TABLET(S): 8.6 TABLET ORAL at 21:56

## 2020-12-01 RX ADMIN — LEVETIRACETAM 500 MILLIGRAM(S): 250 TABLET, FILM COATED ORAL at 17:28

## 2020-12-01 RX ADMIN — Medication 650 MILLIGRAM(S): at 18:35

## 2020-12-01 RX ADMIN — AMLODIPINE BESYLATE 5 MILLIGRAM(S): 2.5 TABLET ORAL at 17:28

## 2020-12-01 RX ADMIN — MONTELUKAST 10 MILLIGRAM(S): 4 TABLET, CHEWABLE ORAL at 17:28

## 2020-12-01 RX ADMIN — Medication 50 GRAM(S): at 06:39

## 2020-12-01 RX ADMIN — Medication 650 MILLIGRAM(S): at 17:57

## 2020-12-01 RX ADMIN — LEVETIRACETAM 420 MILLIGRAM(S): 250 TABLET, FILM COATED ORAL at 05:14

## 2020-12-01 RX ADMIN — CHLORHEXIDINE GLUCONATE 1 APPLICATION(S): 213 SOLUTION TOPICAL at 05:15

## 2020-12-01 RX ADMIN — Medication 25 MILLIGRAM(S): at 17:28

## 2020-12-01 RX ADMIN — Medication 650 MILLIGRAM(S): at 06:43

## 2020-12-01 NOTE — PROGRESS NOTE ADULT - SUBJECTIVE AND OBJECTIVE BOX
INTERVAL HPI/OVERNIGHT EVENTS:  89 year old Female w/ PMHX of HTN, HLD, and Afib on Coumadin presenting 11/30/2020 after unwitnessed fall today at home. Pt states that she slipped and fell backwards, hitting the back of her head, ?LOC. Son at home heard the pt fall and came up immediately to find the pt conscious on the floor. Small Laceration noted to posterior aspect of head, bleeding controlled by EMS. CTH revealed acute parafalcine SDH. INR 6.4, given VitK and Kcentra. Repeat CTH showed increased component along left tentorial leaflet. 3rd CTH unchanged.   Patient seen and examined this morning with neurosurgical and neuroICU team.    Vital Signs Last 24 Hrs  T(C): 36.8 (01 Dec 2020 19:07), Max: 36.9 (01 Dec 2020 08:00)  T(F): 98.3 (01 Dec 2020 19:07), Max: 98.4 (01 Dec 2020 08:00)  HR: 68 (01 Dec 2020 19:00) (53 - 81)  BP: 129/57 (01 Dec 2020 19:00) (93/48 - 143/58)  BP(mean): 77 (01 Dec 2020 19:00) (63 - 101)  RR: 19 (01 Dec 2020 19:00) (14 - 29)  SpO2: 98% (01 Dec 2020 19:00) (95% - 100%)    PHYSICAL EXAM:  GENERAL: NAD, well-groomed, well-developed  HEAD: Scalp laceration, no signs of active bleeding  MARYAN COMA SCORE: E-4 V-5 M-6 = 15  MENTAL STATUS: AAO x3; Awake; Opens eyes spontaneously. Appropriately conversant without aphasia. following simple commands  CRANIAL NERVES: Visual acuity normal for age, PERRL. EOMI without nystagmus. Facial sensation intact V1-3 distribution b/l. Face symmetric w/ normal eye closure and smile, tongue midline. Hearing grossly intact. Speech clear.   MOTOR: Full ROM. ZARATE x4; Mild left sided weakness  SENSATION: grossly intact to light touch all extremities  CHEST/LUNG: Nonlabored breaths, CTA b/l.   HEART: +S1/+S2    LABS:             10.3   5.87  )-----------( 105      ( 01 Dec 2020 04:19 )             31.9     12-01    139  |  110<H>  |  34.0<H>  ----------------------------<  82  4.1   |  18.0<L>  |  0.74    Ca    9.5      01 Dec 2020 04:19  Phos  3.0     12-01  Mg     1.8     12-01    TPro  6.9  /  Alb  3.8  /  TBili  0.5  /  DBili  x   /  AST  39<H>  /  ALT  24  /  AlkPhos  94  11-30    PT/INR - ( 01 Dec 2020 04:19 )   PT: 14.4 sec;   INR: 1.25 ratio         PTT - ( 01 Dec 2020 04:19 )  PTT:31.8 sec      11-30 @ 07:01  -  12-01 @ 07:00  --------------------------------------------------------  IN: 800 mL / OUT: 50 mL / NET: 750 mL    12-01 @ 07:01  -  12-01 @ 19:51  --------------------------------------------------------  IN: 1250 mL / OUT: 800 mL / NET: 450 mL        RADIOLOGY & ADDITIONAL TESTS:  CT Head No Cont (12.01.20 @ 06:20)   IMPRESSION:  Compared to the previous examination, the subdural and subarachnoid hemorrhage is stable. No new hemorrhage has developed.              CAPRINI SCORE [CLOT]:  Patient has an estimated Caprini score of greater than 5.  However, the patient's unique clinical situation will be addressed in an individual manner to determine appropriate anticoagulation treatment, if any.

## 2020-12-01 NOTE — OCCUPATIONAL THERAPY INITIAL EVALUATION ADULT - PLANNED THERAPY INTERVENTIONS, OT EVAL
strengthening/transfer training/toilet/parent/caregiver training.../ADL retraining/balance training/bed mobility training

## 2020-12-01 NOTE — PROGRESS NOTE ADULT - ASSESSMENT
Pt is a 89 yo F with A fib on Coumadin, HTN and HLD presented on 11/30/2020 after unwitnessed fall  at home. Pt states that she slipped and fell backwards, hitting the back of her head. Unknown LOC.  Son at home heard the pt fall and came up immediately to find the pt conscious on the floor. Small Laceration noted to back of head, bleeding controlled by EMS. CT head revealed Acute parafalcine subdural hematoma measuring 9 mm, extending along the left tentorium. Acute bilateral frontal subdural hematomas measuring up to 5 mm. Scattered small left frontal parietal subarachnoid hemorrhage. No midline shift, hydrocephalus, or effacement of basal cisterns. No evidence of displaced calvarial fracture. INR was 6.4 and pt was Rx'd with Vit K + Kcentra. 2nd repeat CT head: increased component along the left tentorial leaflet is likely due to redistribution of blood products. 3rd repeat CT head unchanged. ICU admitting dx: TBI s/p fall in setting of coagulopathy 2 to Coumadin    CVS: Cont CCB and BB for BP control  Heme: No NSAIDS or Heparin for now/ Since pt is a fall risk will need to reconsider risk vs benefit of AC for A fib  FEN: Avoid hyponatremia/ Po diet  Neuro: Cont neurochecks q 2hrs/ Keppra for Sz prophylaxis x7 days/ Repeat CT head tomorrow if stable may transfer out of ICU/ F/u as per Neurosurg  OOB->chair/ PT/OT

## 2020-12-01 NOTE — PHYSICAL THERAPY INITIAL EVALUATION ADULT - ADDITIONAL COMMENTS
per patient, she lives alone for the most part. Her nephew lives downstairs but cannot help. She lives upstairs ~6-8 steps with a handrail. pt reports that she rarely needs to negotiate the steps, because she stays in her living area most of the time. Pt uses a SAC at baseline, and owns a RW.

## 2020-12-01 NOTE — OCCUPATIONAL THERAPY INITIAL EVALUATION ADULT - PERTINENT HX OF CURRENT PROBLEM, REHAB EVAL
Pt presents to ED s/p fall, hitting back of head. Head CT with acute parafalcine subdural hematoma measuring 9mm, extending along the left tentorium; acute bilateral frontal subdural hematomas measuring up to 5mm; scattered small left frontal parietal subarachnoid hemorrhage; no midline shift, hydrocephalus, or effacement of basal cisterns; no evidence of displaced calvarial fracture. Pt also with posterior scalp laceration.

## 2020-12-01 NOTE — OCCUPATIONAL THERAPY INITIAL EVALUATION ADULT - SPECIAL TRAINING, OT EVAL
Functional mobility for a couple feet with handheld assistance due to decreased strength and decreased balance; cues for foot placement, upright postural control and weightshifting. Pt requires increased time and verbal/tactile cues throughout mobility for safety.

## 2020-12-01 NOTE — CHART NOTE - NSCHARTNOTEFT_GEN_A_CORE
Tertiary Trauma Survey (TTS)    Date of TTS: 12-01-20 @ 06:36                             Admit Date: 11-30-20 @ 11:45      Trauma Activation:      Subjective / 24 hour events:  Patient evaluated at bedside, no acute distress. Patient reports no pain besides a headache. Patient denies chest pain, SOB, fevers, or chills.     Vital Signs Last 24 Hrs  T(C): 36.6 (01 Dec 2020 04:27), Max: 36.8 (30 Nov 2020 09:57)  T(F): 97.9 (01 Dec 2020 04:27), Max: 98.2 (30 Nov 2020 09:57)  HR: 66 (01 Dec 2020 06:00) (53 - 70)  BP: 143/58 (01 Dec 2020 05:00) (93/48 - 143/58)  BP(mean): 82 (01 Dec 2020 05:00) (63 - 91)  RR: 23 (01 Dec 2020 06:00) (14 - 23)  SpO2: 100% (01 Dec 2020 06:00) (95% - 100%)    Physical Exam:    Neuro: [x ] non focal neurological exam [ ] Focal Neurological deficits noted to be:     HEENT: [x ] Normo-cephalic/atraumatic  [ ] abnormalities noted to be:    Pulm/Chest:  [x ] CTA b/l  [x ] chest wall non tender  [ ] abnormalities noted to be:    Cardiac: [x ] S1S2, sinus rhythm  [ ] abnormalities noted to be:     GI / Abdomen: [x ] Soft, non-tender, non-distended [ ] abnormalities noted to be:    Musculoskeletal / Extremities: [x ]normal active ROM  [ ]  abnormalities noted to be:    Integumentary: [x ] Skin intact [x ] Warm [x ] Dry [ ]abnormalities noted to be:    Vascular: [ x] 2+ palpable distal pulses  [ ] JASON:       [ ] abnormalities noted to be:      List Injuries Identified to Date: No traumatic injuries noted    CT head: Subdural hematoma  Chest X-ray with no traumatic injuries    Patient Tertiary Trauma Survey (TTS)    Date of TTS: 12-01-20 @ 06:36                             Admit Date: 11-30-20 @ 11:45      Trauma Activation:      Subjective / 24 hour events:  Patient evaluated at bedside, no acute distress. Patient reports no pain besides a headache. Patient denies chest pain, SOB, fevers, or chills.     Vital Signs Last 24 Hrs  T(C): 36.6 (01 Dec 2020 04:27), Max: 36.8 (30 Nov 2020 09:57)  T(F): 97.9 (01 Dec 2020 04:27), Max: 98.2 (30 Nov 2020 09:57)  HR: 66 (01 Dec 2020 06:00) (53 - 70)  BP: 143/58 (01 Dec 2020 05:00) (93/48 - 143/58)  BP(mean): 82 (01 Dec 2020 05:00) (63 - 91)  RR: 23 (01 Dec 2020 06:00) (14 - 23)  SpO2: 100% (01 Dec 2020 06:00) (95% - 100%)    Physical Exam:    Neuro: [x ] non focal neurological exam [ ] Focal Neurological deficits noted to be:     HEENT: [x ] Normo-cephalic/atraumatic  [ ] abnormalities noted to be:    Pulm/Chest:  [x ] CTA b/l  [x ] chest wall non tender  [ ] abnormalities noted to be:    Cardiac: [x ] S1S2, sinus rhythm  [ ] abnormalities noted to be:     GI / Abdomen: [x ] Soft, non-tender, non-distended [ ] abnormalities noted to be:    Musculoskeletal / Extremities: [x ]normal active ROM  [ ]  abnormalities noted to be:    Integumentary: [x ] Skin intact [x ] Warm [x ] Dry [ ]abnormalities noted to be:    Vascular: [ x] 2+ palpable distal pulses  [ ] JASON:       [ ] abnormalities noted to be:      List Injuries Identified to Date: No traumatic injuries noted    CT head:  Compared to the previous examination, the subdural and subarachnoid hemorrhage is stable. No new hemorrhage has developed  Chest X-ray with no traumatic injuries    A/P  Patient is currently stable under the management of neurosurgical ICU, no other traumatic injuries noted besides stable subdural and subarachnoid hemorrhage.    -Please consult if any other concerns    Kamari Hawley M.D Tertiary Trauma Survey (TTS)    Date of TTS: 12-01-20 @ 06:36                             Admit Date: 11-30-20 @ 11:45      Trauma Activation:      Subjective / 24 hour events:  Patient evaluated at bedside, no acute distress. Patient reports no pain besides a headache. Patient denies chest pain, SOB, fevers, or chills.     Vital Signs Last 24 Hrs  T(C): 36.6 (01 Dec 2020 04:27), Max: 36.8 (30 Nov 2020 09:57)  T(F): 97.9 (01 Dec 2020 04:27), Max: 98.2 (30 Nov 2020 09:57)  HR: 66 (01 Dec 2020 06:00) (53 - 70)  BP: 143/58 (01 Dec 2020 05:00) (93/48 - 143/58)  BP(mean): 82 (01 Dec 2020 05:00) (63 - 91)  RR: 23 (01 Dec 2020 06:00) (14 - 23)  SpO2: 100% (01 Dec 2020 06:00) (95% - 100%)    Physical Exam:    Neuro: [x ] non focal neurological exam [ ] Focal Neurological deficits noted to be:     HEENT: [x ] Normo-cephalic/atraumatic  [ ] abnormalities noted to be:    Pulm/Chest:  [x ] CTA b/l  [x ] chest wall non tender  [ ] abnormalities noted to be:    Cardiac: [x ] S1S2, sinus rhythm  [ ] abnormalities noted to be:     GI / Abdomen: [x ] Soft, non-tender, non-distended [ ] abnormalities noted to be:    Musculoskeletal / Extremities: [x ]normal active ROM  [ ]  abnormalities noted to be:    Integumentary: [x ] Skin intact [x ] Warm [x ] Dry [ ]abnormalities noted to be:    Vascular: [ x] 2+ palpable distal pulses  [ ] JASON:       [ ] abnormalities noted to be:      List Injuries Identified to Date: No traumatic injuries noted    CT head:  Compared to the previous examination, the subdural and subarachnoid hemorrhage is stable. No new hemorrhage has developed  Chest X-ray with no traumatic injuries    A/P  Patient is currently stable under the management of neurosurgical ICU, no other traumatic injuries noted besides stable subdural and subarachnoid hemorrhage.    -Please consult if any other concerns    Kamari Hawley M.D,./

## 2020-12-01 NOTE — OCCUPATIONAL THERAPY INITIAL EVALUATION ADULT - ADDITIONAL COMMENTS
Pt lives in house with 6 [address] and 6 steps inside up to bedroom and bathroom. Bathroom has bathtub with curtains. Pt owns [employer], [employer], [employer]. Pt is right handed. Pt drives. Pt's nephew works so is only available to assist occasionally.

## 2020-12-01 NOTE — PROGRESS NOTE ADULT - ASSESSMENT
89 year old Female w/ PMHX of HTN, HLD, and Afib on Coumadin presenting 11/30/2020 after unwitnessed fall, ?LOC.   CTH revealed acute parafalcine SDH. INR 6.4, given VitK and Kcentra.         Plan  - Q2 neuro checks  - Imaging revealed  - Pain control  - Keppra for AED for 7 days  - Monitor Na levels, AM labs  - Encourage OOB to chair, PT/OT ordered   - CTH for any deterioration in neuro exam   - Medical management/supportive care per NSICU  - D/w Dr. Adams and NSICU team

## 2020-12-01 NOTE — PROGRESS NOTE ADULT - SUBJECTIVE AND OBJECTIVE BOX
HPI:  Pt is a 89 yo F with A fib on Coumadin, HTN and HLD presented on 2020 after unwitnessed fall  at home. Pt states that she slipped and fell backwards, hitting the back of her head. Unknown LOC.  Son at home heard the pt fall and came up immediately to find the pt conscious on the floor. Small Laceration noted to back of head, bleeding controlled by EMS. CT head revealed Acute parafalcine subdural hematoma measuring 9 mm, extending along the left tentorium. Acute bilateral frontal subdural hematomas measuring up to 5 mm. Scattered small left frontal parietal subarachnoid hemorrhage. No midline shift, hydrocephalus, or effacement of basal cisterns. No evidence of displaced calvarial fracture. INR was 6.4 and pt was Rx'd with Vit K + Kcentra. 2nd repeat CT head: increased component along the left tentorial leaflet is likely due to redistribution of blood products. 3rd repeat CT head unchanged. ICU admitting dx: TBI s/p fall in setting of coagulopathy 2 to Coumadin      ## Labs:  CBC:                        10.3   5.87  )-----------( 105      ( 01 Dec 2020 04:19 )             31.9     Chem:  12-    139  |  110<H>  |  34.0<H>  ----------------------------<  82  4.1   |  18.0<L>  |  0.74    Ca    9.5      01 Dec 2020 04:19  Phos  3.0     12-  Mg     1.8     12-    TPro  6.9  /  Alb  3.8  /  TBili  0.5  /  DBili  x   /  AST  39<H>  /  ALT  24  /  AlkPhos  94  11-30    Coags:  PT/INR - ( 01 Dec 2020 04:19 )   PT: 14.4 sec;   INR: 1.25 ratio         PTT - ( 01 Dec 2020 04:19 )  PTT:31.8 sec        ## Imaging:    ## Medications:    amLODIPine   Tablet 5 milliGRAM(s) Oral daily  metoprolol tartrate 25 milliGRAM(s) Oral two times a day    montelukast 10 milliGRAM(s) Oral daily    atorvastatin 80 milliGRAM(s) Oral at bedtime  levothyroxine 50 MICROGram(s) Oral daily      polyethylene glycol 3350 17 Gram(s) Oral daily  senna 2 Tablet(s) Oral at bedtime    acetaminophen   Tablet .. 650 milliGRAM(s) Oral every 6 hours PRN  levETIRAcetam 500 milliGRAM(s) Oral two times a day  traMADol 25 milliGRAM(s) Oral every 6 hours PRN      ## Vitals:  T(C): 36.5 (20 @ 13:51), Max: 36.9 (20 @ 08:00)  HR: 69 (20 @ 13:00) (53 - 69)  BP: 103/69 (20 @ 13:00) (93/48 - 143/58)  BP(mean): 77 (20 @ 13:00) (63 - 101)  RR: 21 (20 @ 13:00) (14 - 27)  SpO2: 100% (20 @ 13:00) (95% - 100%)  Wt(kg): --  Vent:   AB-30 @ 07:01  -   @ 07:00  --------------------------------------------------------  IN: 800 mL / OUT: 50 mL / NET: 750 mL          ## P/E:  Gen: lying comfortably in bed in no apparent distress  Lungs: CTA  Heart: RRR  Abd: Soft/+BS  Ext: No edema  Neuro: AAO x3/ Only neuro deficit slight L UE weakness    CENTRAL LINE: [ ] YES [ ] NO  LOCATION:   DATE INSERTED:  REMOVE: [ ] YES [ ] NO      COLINDRES: [ ] YES [ ] NO    DATE INSERTED:  REMOVE:  [ ] YES [ ] NO      A-LINE:  [ ] YES [ ] NO  LOCATION:   DATE INSERTED:  REMOVE:  [ ] YES [ ] NO  EXPLAIN:      CODE STATUS: [x ] full code  [ ] DNR  [ ] DNI  [ ] MOLST  Goals of care discussion: [ ] yes

## 2020-12-02 LAB
ANION GAP SERPL CALC-SCNC: 8 MMOL/L — SIGNIFICANT CHANGE UP (ref 5–17)
APTT BLD: 29.3 SEC — SIGNIFICANT CHANGE UP (ref 27.5–35.5)
BUN SERPL-MCNC: 38 MG/DL — HIGH (ref 8–20)
CALCIUM SERPL-MCNC: 9.5 MG/DL — SIGNIFICANT CHANGE UP (ref 8.6–10.2)
CHLORIDE SERPL-SCNC: 109 MMOL/L — HIGH (ref 98–107)
CO2 SERPL-SCNC: 22 MMOL/L — SIGNIFICANT CHANGE UP (ref 22–29)
CREAT SERPL-MCNC: 0.93 MG/DL — SIGNIFICANT CHANGE UP (ref 0.5–1.3)
GLUCOSE SERPL-MCNC: 100 MG/DL — HIGH (ref 70–99)
HCT VFR BLD CALC: 30.1 % — LOW (ref 34.5–45)
HGB BLD-MCNC: 9.7 G/DL — LOW (ref 11.5–15.5)
INR BLD: 1.18 RATIO — HIGH (ref 0.88–1.16)
MAGNESIUM SERPL-MCNC: 2 MG/DL — SIGNIFICANT CHANGE UP (ref 1.6–2.6)
MCHC RBC-ENTMCNC: 27.6 PG — SIGNIFICANT CHANGE UP (ref 27–34)
MCHC RBC-ENTMCNC: 32.2 GM/DL — SIGNIFICANT CHANGE UP (ref 32–36)
MCV RBC AUTO: 85.5 FL — SIGNIFICANT CHANGE UP (ref 80–100)
PHOSPHATE SERPL-MCNC: 2.3 MG/DL — LOW (ref 2.4–4.7)
PLATELET # BLD AUTO: 110 K/UL — LOW (ref 150–400)
POTASSIUM SERPL-MCNC: 4.4 MMOL/L — SIGNIFICANT CHANGE UP (ref 3.5–5.3)
POTASSIUM SERPL-SCNC: 4.4 MMOL/L — SIGNIFICANT CHANGE UP (ref 3.5–5.3)
PROTHROM AB SERPL-ACNC: 13.6 SEC — SIGNIFICANT CHANGE UP (ref 10.6–13.6)
RBC # BLD: 3.52 M/UL — LOW (ref 3.8–5.2)
RBC # FLD: 17 % — HIGH (ref 10.3–14.5)
SODIUM SERPL-SCNC: 139 MMOL/L — SIGNIFICANT CHANGE UP (ref 135–145)
WBC # BLD: 7.96 K/UL — SIGNIFICANT CHANGE UP (ref 3.8–10.5)
WBC # FLD AUTO: 7.96 K/UL — SIGNIFICANT CHANGE UP (ref 3.8–10.5)

## 2020-12-02 PROCEDURE — 99232 SBSQ HOSP IP/OBS MODERATE 35: CPT

## 2020-12-02 PROCEDURE — 99233 SBSQ HOSP IP/OBS HIGH 50: CPT

## 2020-12-02 PROCEDURE — 70450 CT HEAD/BRAIN W/O DYE: CPT | Mod: 26

## 2020-12-02 RX ORDER — SODIUM,POTASSIUM PHOSPHATES 278-250MG
1 POWDER IN PACKET (EA) ORAL ONCE
Refills: 0 | Status: COMPLETED | OUTPATIENT
Start: 2020-12-02 | End: 2020-12-02

## 2020-12-02 RX ADMIN — Medication 650 MILLIGRAM(S): at 09:11

## 2020-12-02 RX ADMIN — MONTELUKAST 10 MILLIGRAM(S): 4 TABLET, CHEWABLE ORAL at 13:01

## 2020-12-02 RX ADMIN — Medication 25 MILLIGRAM(S): at 17:01

## 2020-12-02 RX ADMIN — POLYETHYLENE GLYCOL 3350 17 GRAM(S): 17 POWDER, FOR SOLUTION ORAL at 13:01

## 2020-12-02 RX ADMIN — Medication 650 MILLIGRAM(S): at 08:11

## 2020-12-02 RX ADMIN — Medication 1 PACKET(S): at 13:00

## 2020-12-02 RX ADMIN — Medication 50 MICROGRAM(S): at 05:43

## 2020-12-02 RX ADMIN — CHLORHEXIDINE GLUCONATE 1 APPLICATION(S): 213 SOLUTION TOPICAL at 05:43

## 2020-12-02 RX ADMIN — Medication 25 MILLIGRAM(S): at 05:43

## 2020-12-02 RX ADMIN — AMLODIPINE BESYLATE 5 MILLIGRAM(S): 2.5 TABLET ORAL at 05:43

## 2020-12-02 RX ADMIN — LEVETIRACETAM 500 MILLIGRAM(S): 250 TABLET, FILM COATED ORAL at 17:01

## 2020-12-02 RX ADMIN — ATORVASTATIN CALCIUM 80 MILLIGRAM(S): 80 TABLET, FILM COATED ORAL at 21:44

## 2020-12-02 RX ADMIN — LEVETIRACETAM 500 MILLIGRAM(S): 250 TABLET, FILM COATED ORAL at 05:43

## 2020-12-02 NOTE — CHART NOTE - NSCHARTNOTEFT_GEN_A_CORE
Comfort (niece, HCP) [phone] updated via telephone with patient's current status and updates. All questions answered ~11:50 AM

## 2020-12-02 NOTE — PROGRESS NOTE ADULT - SUBJECTIVE AND OBJECTIVE BOX
HPI:  89 y/o F with hx of HTN, HLD, and Afib on Coumadin presenting today after unwitnessed fall today at home. Pt states that she slipped and fell backwards, hitting the back of her head. Unknown loc, son at home heard the pt fall and came up immediately to find the pt conscious on the floor. Small Laceration noted to back of head, bleeding controlled by EMS. Currently patient complaining of Lt sided headache. Pt denies any chest pain, dyspnea, nausea/vomiting, neck pain, back pain, weakness, numbness, tingling, dizziness.  (2020 12:55)      24 hr events:      ## Labs:  CBC:                        9.7    7.96  )-----------( 110      ( 02 Dec 2020 04:59 )             30.1     Chem:      139  |  109<H>  |  38.0<H>  ----------------------------<  100<H>  4.4   |  22.0  |  0.93    Ca    9.5      02 Dec 2020 04:59  Phos  2.3       Mg     2.0           Coags:  PT/INR - ( 02 Dec 2020 04:59 )   PT: 13.6 sec;   INR: 1.18 ratio         PTT - ( 02 Dec 2020 04:59 )  PTT:29.3 sec        ## Imaging:    ## Medications:    amLODIPine   Tablet 5 milliGRAM(s) Oral daily  metoprolol tartrate 25 milliGRAM(s) Oral two times a day    montelukast 10 milliGRAM(s) Oral daily    atorvastatin 80 milliGRAM(s) Oral at bedtime  levothyroxine 50 MICROGram(s) Oral daily      polyethylene glycol 3350 17 Gram(s) Oral daily  senna 2 Tablet(s) Oral at bedtime    acetaminophen   Tablet .. 650 milliGRAM(s) Oral every 6 hours PRN  levETIRAcetam 500 milliGRAM(s) Oral two times a day  traMADol 25 milliGRAM(s) Oral every 6 hours PRN      ## Vitals:  T(C): 36.7 (20 @ 08:00), Max: 36.9 (20 @ 04:53)  HR: 65 (20 @ 11:00) (53 - 81)  BP: 104/56 (20 @ 11:00) (91/48 - 135/57)  BP(mean): 71 (20 @ 11:00) (61 - 86)  RR: 26 (20 @ 11:00) (17 - 31)  SpO2: 99% (20 @ 11:00) (84% - 100%)  Wt(kg): --  Vent:   AB-01 @ 07:01  -   @ 07:00  --------------------------------------------------------  IN: 1250 mL / OUT: 1600 mL / NET: -350 mL     @ 07:01  -   @ 11:12  --------------------------------------------------------  IN: 120 mL / OUT: 300 mL / NET: -180 mL          ## P/E:  Gen: lying comfortably in bed in no apparent distress  Mouth:   Neck:  Lungs:   Heart:   Abd:  Ext:  Neuro:    CENTRAL LINE: [ ] YES [ ] NO  LOCATION:   DATE INSERTED:  REMOVE: [ ] YES [ ] NO      JENS: [ ] YES [ ] NO    DATE INSERTED:  REMOVE:  [ ] YES [ ] NO      A-LINE:  [ ] YES [ ] NO  LOCATION:   DATE INSERTED:  REMOVE:  [ ] YES [ ] NO  EXPLAIN:    GLOBAL ISSUE/BEST PRACTICE:  Analgesia:  Sedation:  HOB elevation: yes  Stress ulcer prophylaxis:  VTE prophylaxis:  Oral Care:  Glycemic control:  Nutrition:    CODE STATUS: [ ] full code  [ ] DNR  [ ] DNI  [ ] [facility]  Goals of care discussion: [ ] yes HPI:  Pt is a 89 yo F with A fib on Coumadin, HTN and HLD presented on 2020 after unwitnessed fall  at home. Pt states that she slipped and fell backwards, hitting the back of her head. Unknown LOC.  Son at home heard the pt fall and came up immediately to find the pt conscious on the floor. Small Laceration noted to back of head, bleeding controlled by EMS. CT head revealed Acute parafalcine subdural hematoma measuring 9 mm, extending along the left tentorium. Acute bilateral frontal subdural hematomas measuring up to 5 mm. Scattered small left frontal parietal subarachnoid hemorrhage. No midline shift, hydrocephalus, or effacement of basal cisterns. No evidence of displaced calvarial fracture. INR was 6.4 and pt was Rx'd with Vit K + Kcentra. 2nd repeat CT head: increased component along the left tentorial leaflet is likely due to redistribution of blood products. 3rd repeat CT head unchanged. ICU admitting dx: TBI s/p fall in setting of coagulopathy 2 to Coumadin    24 hr events: Uneventful      ## Labs:  CBC:                        9.7    7.96  )-----------( 110      ( 02 Dec 2020 04:59 )             30.1     Chem:  12-    139  |  109<H>  |  38.0<H>  ----------------------------<  100<H>  4.4   |  22.0  |  0.93    Ca    9.5      02 Dec 2020 04:59  Phos  2.3     12-02  Mg     2.0     12-02      Coags:  PT/INR - ( 02 Dec 2020 04:59 )   PT: 13.6 sec;   INR: 1.18 ratio         PTT - ( 02 Dec 2020 04:59 )  PTT:29.3 sec        ## Imaging:    ## Medications:    amLODIPine   Tablet 5 milliGRAM(s) Oral daily  metoprolol tartrate 25 milliGRAM(s) Oral two times a day    montelukast 10 milliGRAM(s) Oral daily    atorvastatin 80 milliGRAM(s) Oral at bedtime  levothyroxine 50 MICROGram(s) Oral daily      polyethylene glycol 3350 17 Gram(s) Oral daily  senna 2 Tablet(s) Oral at bedtime    acetaminophen   Tablet .. 650 milliGRAM(s) Oral every 6 hours PRN  levETIRAcetam 500 milliGRAM(s) Oral two times a day  traMADol 25 milliGRAM(s) Oral every 6 hours PRN      ## Vitals:  T(C): 36.7 (20 @ 08:00), Max: 36.9 (20 @ 04:53)  HR: 65 (20 @ 11:00) (53 - 81)  BP: 104/56 (20 @ 11:00) (91/48 - 135/57)  BP(mean): 71 (20 @ 11:00) (61 - 86)  RR: 26 (20 @ 11:00) (17 - 31)  SpO2: 99% (20 @ 11:00) (84% - 100%)  Wt(kg): --  Vent:   AB-01 @ 07:01  -   @ 07:00  --------------------------------------------------------  IN: 1250 mL / OUT: 1600 mL / NET: -350 mL     @ 07:01  -   @ 11:12  --------------------------------------------------------  IN: 120 mL / OUT: 300 mL / NET: -180 mL      ## P/E:  Gen: lying comfortably in bed in no apparent distress  Lungs: CTA  Heart: Irregular  Abd: Soft/+BS  Ext: No edema  Neuro: AAO x3/ Only neuro deficit slight L UE weakness    CENTRAL LINE: [ ] YES [ ] NO  LOCATION:   DATE INSERTED:  REMOVE: [ ] YES [ ] NO      COLINDRES: [ ] YES [ ] NO    DATE INSERTED:  REMOVE:  [ ] YES [ ] NO      A-LINE:  [ ] YES [ ] NO  LOCATION:   DATE INSERTED:  REMOVE:  [ ] YES [ ] NO  EXPLAIN:      CODE STATUS: [x ] full code  [ ] DNR  [ ] DNI  [ ] MOLST  Goals of care discussion: [ ] yes

## 2020-12-02 NOTE — PROGRESS NOTE ADULT - ASSESSMENT
Pt is a 89 yo F with A fib on Coumadin, HTN and HLD presented on 11/30/2020 after unwitnessed fall  at home. Pt states that she slipped and fell backwards, hitting the back of her head. Unknown LOC.  Son at home heard the pt fall and came up immediately to find the pt conscious on the floor. Small Laceration noted to back of head, bleeding controlled by EMS. CT head revealed Acute parafalcine subdural hematoma measuring 9 mm, extending along the left tentorium. Acute bilateral frontal subdural hematomas measuring up to 5 mm. Scattered small left frontal parietal subarachnoid hemorrhage. No midline shift, hydrocephalus, or effacement of basal cisterns. No evidence of displaced calvarial fracture. INR was 6.4 and pt was Rx'd with Vit K + Kcentra. 2nd repeat CT head: increased component along the left tentorial leaflet is likely due to redistribution of blood products. 3rd repeat CT head unchanged. ICU admitting dx: TBI s/p fall in setting of coagulopathy 2 to Coumadin    CVS: Cont CCB and BB for BP control  Heme: No NSAIDS or Heparin for now/ Since pt is a fall risk will need to reconsider risk vs benefit of AC for A fib  FEN: Avoid hyponatremia/ Po diet  Neuro: Cont neurochecks q 2hrs/ Keppra for Sz prophylaxis x7 days/ Repeat CT head tomorrow if stable may transfer out of ICU/ F/u as per Neurosurg  OOB->chair/ PT/OT   Pt is a 89 yo F with A fib on Coumadin, HTN and HLD presented on 11/30/2020 after unwitnessed fall  at home. Pt states that she slipped and fell backwards, hitting the back of her head. Unknown LOC.  Son at home heard the pt fall and came up immediately to find the pt conscious on the floor. Small Laceration noted to back of head, bleeding controlled by EMS. CT head revealed Acute parafalcine subdural hematoma measuring 9 mm, extending along the left tentorium. Acute bilateral frontal subdural hematomas measuring up to 5 mm. Scattered small left frontal parietal subarachnoid hemorrhage. No midline shift, hydrocephalus, or effacement of basal cisterns. No evidence of displaced calvarial fracture. INR was 6.4 and pt was Rx'd with Vit K + Kcentra. 2nd repeat CT head: increased component along the left tentorial leaflet is likely due to redistribution of blood products. 3rd repeat CT head unchanged. ICU admitting dx: TBI s/p fall in setting of coagulopathy 2 to Coumadin    CVS: Cont CCB and BB for BP control  Heme: Coumadin may be restarted in 2 wks but since pt is a fall risk will need to reconsider risk vs benefit of AC for A fib  FEN: Avoid hyponatremia/ Po diet  Neuro: Cont neurochecks q 4hrs/ Keppra for Sz prophylaxis x7 days/ Repeat CT head No significant interval change in left-sided subdural hematoma and trace high left frontoparietal subarachnoid hemorrhage compared with prior exam from 12/1/2020. No significant mass effect or shift of the midline/ F/u as per Neurosurg  Social: May transfer out of ICU/ OOB->chair/ PT/OT

## 2020-12-02 NOTE — PROGRESS NOTE ADULT - ASSESSMENT
89 year old Female w/ PMHX of HTN, HLD, and Afib on Coumadin presenting 11/30/2020 after unwitnessed at home. CTH revealed an acute parafalcine SDH and bilateral frontal SDH with scattered SAH. INR 6.4, given VitK and Kcentra. Repeat CTH  essentially unchanged and patient neurologically stable.     - Okay to downgrade to the floor   - Q4 neuro checks  - Imaging reviewed and stable, no further CT unless acute change in exam   - Keppra for 7 days  - PT/OOB  - Coumadin on hold, may resume 2 weeks post bleed, will follow up out patient in the office

## 2020-12-02 NOTE — PROGRESS NOTE ADULT - ATTENDING COMMENTS
NSGY Attg:    see above    patient seen and examined    repeat CT stable    agree with exam and plan as above
NSGY Attg:    see above    patient seen and examined by PA staff     agree with exam and plan as above

## 2020-12-03 ENCOUNTER — TRANSCRIPTION ENCOUNTER (OUTPATIENT)
Age: 85
End: 2020-12-03

## 2020-12-03 VITALS
TEMPERATURE: 98 F | RESPIRATION RATE: 18 BRPM | HEART RATE: 94 BPM | DIASTOLIC BLOOD PRESSURE: 67 MMHG | OXYGEN SATURATION: 94 % | SYSTOLIC BLOOD PRESSURE: 124 MMHG

## 2020-12-03 LAB
ANION GAP SERPL CALC-SCNC: 10 MMOL/L — SIGNIFICANT CHANGE UP (ref 5–17)
BUN SERPL-MCNC: 30 MG/DL — HIGH (ref 8–20)
CALCIUM SERPL-MCNC: 9.8 MG/DL — SIGNIFICANT CHANGE UP (ref 8.6–10.2)
CHLORIDE SERPL-SCNC: 109 MMOL/L — HIGH (ref 98–107)
CO2 SERPL-SCNC: 22 MMOL/L — SIGNIFICANT CHANGE UP (ref 22–29)
CREAT SERPL-MCNC: 0.68 MG/DL — SIGNIFICANT CHANGE UP (ref 0.5–1.3)
GLUCOSE SERPL-MCNC: 86 MG/DL — SIGNIFICANT CHANGE UP (ref 70–99)
HCT VFR BLD CALC: 31.9 % — LOW (ref 34.5–45)
HGB BLD-MCNC: 10.2 G/DL — LOW (ref 11.5–15.5)
MAGNESIUM SERPL-MCNC: 1.9 MG/DL — SIGNIFICANT CHANGE UP (ref 1.6–2.6)
MCHC RBC-ENTMCNC: 27.6 PG — SIGNIFICANT CHANGE UP (ref 27–34)
MCHC RBC-ENTMCNC: 32 GM/DL — SIGNIFICANT CHANGE UP (ref 32–36)
MCV RBC AUTO: 86.4 FL — SIGNIFICANT CHANGE UP (ref 80–100)
PHOSPHATE SERPL-MCNC: 2.6 MG/DL — SIGNIFICANT CHANGE UP (ref 2.4–4.7)
PLATELET # BLD AUTO: 116 K/UL — LOW (ref 150–400)
POTASSIUM SERPL-MCNC: 4.4 MMOL/L — SIGNIFICANT CHANGE UP (ref 3.5–5.3)
POTASSIUM SERPL-SCNC: 4.4 MMOL/L — SIGNIFICANT CHANGE UP (ref 3.5–5.3)
RBC # BLD: 3.69 M/UL — LOW (ref 3.8–5.2)
RBC # FLD: 17.3 % — HIGH (ref 10.3–14.5)
SODIUM SERPL-SCNC: 141 MMOL/L — SIGNIFICANT CHANGE UP (ref 135–145)
WBC # BLD: 7.45 K/UL — SIGNIFICANT CHANGE UP (ref 3.8–10.5)
WBC # FLD AUTO: 7.45 K/UL — SIGNIFICANT CHANGE UP (ref 3.8–10.5)

## 2020-12-03 RX ORDER — LEVETIRACETAM 250 MG/1
1 TABLET, FILM COATED ORAL
Qty: 11 | Refills: 0
Start: 2020-12-03

## 2020-12-03 RX ORDER — LEVETIRACETAM 250 MG/1
1 TABLET, FILM COATED ORAL
Qty: 60 | Refills: 0
Start: 2020-12-03 | End: 2021-01-01

## 2020-12-03 RX ADMIN — CHLORHEXIDINE GLUCONATE 1 APPLICATION(S): 213 SOLUTION TOPICAL at 05:02

## 2020-12-03 RX ADMIN — Medication 25 MILLIGRAM(S): at 05:52

## 2020-12-03 RX ADMIN — LEVETIRACETAM 500 MILLIGRAM(S): 250 TABLET, FILM COATED ORAL at 05:52

## 2020-12-03 RX ADMIN — POLYETHYLENE GLYCOL 3350 17 GRAM(S): 17 POWDER, FOR SOLUTION ORAL at 11:26

## 2020-12-03 RX ADMIN — MONTELUKAST 10 MILLIGRAM(S): 4 TABLET, CHEWABLE ORAL at 11:26

## 2020-12-03 RX ADMIN — Medication 50 MICROGRAM(S): at 05:52

## 2020-12-03 RX ADMIN — AMLODIPINE BESYLATE 5 MILLIGRAM(S): 2.5 TABLET ORAL at 05:52

## 2020-12-03 NOTE — DISCHARGE NOTE NURSING/CASE MANAGEMENT/SOCIAL WORK - PATIENT PORTAL LINK FT
You can access the FollowMyHealth Patient Portal offered by [facility] by registering at the following website: [email or web address]. By joining [facility]’s FollowMyHealth portal, you will also be able to view your health information using other applications (apps) compatible with our system.

## 2020-12-03 NOTE — DISCHARGE NOTE PROVIDER - NSDCMRMEDTOKEN_GEN_ALL_CORE_FT
amLODIPine 5 mg oral tablet: 1 tab(s) orally once a day  hydroCHLOROthiazide 12.5 mg oral capsule: 1 cap(s) orally once a day  levothyroxine 50 mcg (0.05 mg) oral tablet: 1 tab(s) orally once a day  lisinopril 40 mg oral tablet: 1 tab(s) orally once a day  metoprolol succinate 50 mg oral tablet, extended release: 1 tab(s) orally once a day  montelukast 10 mg oral tablet: 1 tab(s) orally once a day  rosuvastatin 20 mg oral tablet: 1 tab(s) orally once a day  warfarin 5 mg oral tablet: 1 tab(s) orally once a day   amLODIPine 5 mg oral tablet: 1 tab(s) orally once a day  hydroCHLOROthiazide 12.5 mg oral capsule: 1 cap(s) orally once a day  levETIRAcetam 500 mg oral tablet: 1 tab(s) orally 2 times a day  levothyroxine 50 mcg (0.05 mg) oral tablet: 1 tab(s) orally once a day  lisinopril 40 mg oral tablet: 1 tab(s) orally once a day  metoprolol succinate 50 mg oral tablet, extended release: 1 tab(s) orally once a day  montelukast 10 mg oral tablet: 1 tab(s) orally once a day  rosuvastatin 20 mg oral tablet: 1 tab(s) orally once a day  warfarin 5 mg oral tablet: 1 tab(s) orally once a day

## 2020-12-03 NOTE — DISCHARGE NOTE PROVIDER - HOSPITAL COURSE
89 Y/O female w/ a PMHx of A fib on Coumadin, HTN and HLD presented on 11/30/2020 after unwitnessed fall  at home, hitting the back of her head. Pt was brought in by EMS, +LAC to back of head, CT head findings significant for acute parafalcine subdural hematoma, acute bilateral frontal subdural hematoma, and a scattered small left frontal parietal SAH, no midline shift, hydrocephalus, or effacement of basal cisterns, no evidence of displaced calvarial fracture. INR was found to be 6.4 and pt was treated with Vit K and Kcentra. Repeat CT read as showing increased component along the left tentorial leaflet is likely due to redistribution of blood products, and 3rd CT read as showing unchanged. Pt was initially admitted to NSICU, then transferred to floor when pt more stable. 89 Y/O female w/ a PMHx of A fib on Coumadin, HTN and HLD presented on 11/30/2020 after unwitnessed fall  at home, hitting the back of her head. Pt was brought in by EMS, +LAC to back of head, CT head findings significant for acute parafalcine subdural hematoma, acute bilateral frontal subdural hematoma, and a scattered small left frontal parietal SAH, no midline shift, hydrocephalus, or effacement of basal cisterns, no evidence of displaced calvarial fracture. INR was found to be 6.4 and pt was treated with Vit K and Kcentra. Repeat CT read as showing increased component along the left tentorial leaflet is likely due to redistribution of blood products, and 3rd CT read as showing unchanged. Pt was initially admitted to NSICU, then transferred to floor when pt more stable. Pt reports no new complaints. Pt stable from neurosurgical standpoint for discharge. PT recommends home with PT/assistance.

## 2020-12-03 NOTE — DISCHARGE NOTE PROVIDER - NSDCCPCAREPLAN_GEN_ALL_CORE_FT
PRINCIPAL DISCHARGE DIAGNOSIS  Diagnosis: Subdural hematoma  Assessment and Plan of Treatment:

## 2020-12-03 NOTE — DISCHARGE NOTE PROVIDER - CARE PROVIDER_API CALL
Edinson Adams  NEUROSURGERY  [address] [place] [address]  Phone: [phone]  Fax: [phone]  Follow Up Time:

## 2020-12-03 NOTE — DISCHARGE NOTE PROVIDER - NSDCFUADDINST_GEN_ALL_CORE_FT
Instructions for follow-up, activity and diet: It is important to see your primary physician as well as the physicians noted below within the next week to perform a comprehensive medical review.  Call their offices for an appointment as soon as you leave the hospital.  If you do not have a primary physician, contact the [facility] [place] [phone] located on [address] [place] [address], [place] [address].  Your medical issues appear to be stable at this time, but if your symptoms recur or worsen, contact your physicians and/or return to the hospital if necessary.  If you encounter any issues or questions with your medication, call your physicians before stopping the medication.  Do not drive.  Limit your diet to 2 grams of sodium daily.   Do not take coumadin until after 12/14/20. Please call the office of Dr. Adams to see him in 2 weeks after discharge. Continue keppra.

## 2020-12-08 ENCOUNTER — APPOINTMENT (OUTPATIENT)
Dept: NEUROSURGERY | Facility: CLINIC | Age: 85
End: 2020-12-08
Payer: MEDICARE

## 2020-12-08 DIAGNOSIS — S06.5X9A TRAUMATIC SUBDURAL HEMORRHAGE WITH LOSS OF CONSCIOUSNESS OF UNSPECIFIED DURATION, INITIAL ENCOUNTER: ICD-10-CM

## 2020-12-08 PROCEDURE — 99211 OFF/OP EST MAY X REQ PHY/QHP: CPT | Mod: 95

## 2020-12-09 PROBLEM — S06.5X9A SUBDURAL HEMATOMA: Status: ACTIVE | Noted: 2020-12-08

## 2020-12-09 NOTE — REASON FOR VISIT
[Home] : at home, [unfilled] , at the time of the visit. [Medical Office: ([facility])___] : at the medical office located in  [Family Member] : family member [Verbal consent obtained from patient] : the patient, [unfilled] [FreeTextEntry4] : [facility]fort   (devora)  [FreeTextEntry1] : Ms. Garcia presents for follow up visit via telehealth/post hospitalization [facility] 11/30-12/3/2020. 89 Y/O female w/ a PMHx of A fib on Coumadin, HTN and HLD presented on 11/30/2020 after unwitnessed fall  at home, hitting the back of her head. Pt was brought in by EMS, +LAC to back of head, CT head findings significant for acute parafalcine subdural hematoma, acute bilateral frontal subdural hematoma, \par and a scattered small left frontal parietal SAH, no midline shift, hydrocephalus, or effacement of basal cisterns, no evidence of displaced calvarial fracture. INR was found to be 6.4 and pt was treated with Vit K and Kcentra. \par \par Patient consents to telehealth visit and understands this visit is billed to insurance.  The visit was started for good portion via telehealth and completed via telephone secondary to technical difficulty.\par Patient admits to mild frontal headaches that respond to Tylenol.  Denies any nausea or vomiting. Denies any visual disturbances.  She has not had any seizures.  She has discontinued Keppra for seizure prophylaxis.  Patient's niece endorses she is having some mild agitation and emotional bouts which are improving.  She is ambulating with assistance of a walker.  She is pending PT at home.\par She has not resumed her aspirin therapy.  Per patient, they will discontinue Coumadin for AF management given her high risks for fall and INR.\par She is pending f/u with cardiology, Dr. Graham.\par No new falls.\par \par EXAM: CT BRAIN\par \par PROCEDURE DATE: 12/02/2020\par \par \par \par INTERPRETATION: NONCONTRAST CT OF THE BRAIN DATED 12/2/2020.\par \par CLINICAL INFORMATION: Follow-up subdural hemorrhage.\par \par TECHNIQUE: Axial CT images are obtained from the cranial vertex to the skull base without the administration of IV contrast. Images are reformatted in sagittal and coronal planes.\par \par The study is correlated with a prior exam from 12/1/2020.\par \par FINDINGS:\par \par Left-sided subdural hematoma along the parieto-occipital convexity, the falx, and along the tentorial leaflet is without significant interval change compared with prior exam, component along the posterior falx measuring up to 6 mm. Trace high left frontoparietal subarachnoid hemorrhage is also unchanged. Previously seen thin extra-axial hemorrhage along the right frontal convexity is moderate well visualized on the current exam. There is no significant mass effect or shift of the midline. The basal cisterns are not effaced. There is generalized cerebral and cerebellar volume loss with prominence of the ventricles, sulci, and cerebellar folia. There are chronic ischemic changes in the frontoparietal white matter. There are atherosclerotic calcifications of the intracranial carotid arteries.\par \par There is no significant scalp soft tissue swelling or scalp hematoma. The skull base and bony calvarium are intact. The visualized paranasal sinuses and tympanic/mastoid cavities are clear apart from mild bilateral ethmoid and right maxillary sinus mucosal thickening and trace bilateral mastoid effusions.\par \par IMPRESSION:\par \par No significant interval change in left-sided subdural hematoma and trace high left frontoparietal subarachnoid hemorrhage compared with prior exam from 12/1/2020. No significant mass effect or shift of the midline.\par \par \par Plan:\par Repeat CT head w/o contrast to assess resolution of ICH.\par Maintain fall precautions.\par f/u after imaging.\par Hold aspirin therapy until repeat CT head\par Patient knows to call the office if there are any new or worsening symptoms.\par \par Time started: 1:20\par Time ended: 1:25 pm remainder completed via telephone.

## 2020-12-28 PROCEDURE — 86850 RBC ANTIBODY SCREEN: CPT

## 2020-12-28 PROCEDURE — 72125 CT NECK SPINE W/O DYE: CPT

## 2020-12-28 PROCEDURE — 84100 ASSAY OF PHOSPHORUS: CPT

## 2020-12-28 PROCEDURE — 0225U NFCT DS DNA&RNA 21 SARSCOV2: CPT

## 2020-12-28 PROCEDURE — 96375 TX/PRO/DX INJ NEW DRUG ADDON: CPT

## 2020-12-28 PROCEDURE — 86900 BLOOD TYPING SEROLOGIC ABO: CPT

## 2020-12-28 PROCEDURE — 70450 CT HEAD/BRAIN W/O DYE: CPT

## 2020-12-28 PROCEDURE — 85730 THROMBOPLASTIN TIME PARTIAL: CPT

## 2020-12-28 PROCEDURE — 84443 ASSAY THYROID STIM HORMONE: CPT

## 2020-12-28 PROCEDURE — 93306 TTE W/DOPPLER COMPLETE: CPT

## 2020-12-28 PROCEDURE — 96374 THER/PROPH/DIAG INJ IV PUSH: CPT

## 2020-12-28 PROCEDURE — 93005 ELECTROCARDIOGRAM TRACING: CPT

## 2020-12-28 PROCEDURE — 85025 COMPLETE CBC W/AUTO DIFF WBC: CPT

## 2020-12-28 PROCEDURE — 84484 ASSAY OF TROPONIN QUANT: CPT

## 2020-12-28 PROCEDURE — 85610 PROTHROMBIN TIME: CPT

## 2020-12-28 PROCEDURE — 85027 COMPLETE CBC AUTOMATED: CPT

## 2020-12-28 PROCEDURE — 82962 GLUCOSE BLOOD TEST: CPT

## 2020-12-28 PROCEDURE — 86901 BLOOD TYPING SEROLOGIC RH(D): CPT

## 2020-12-28 PROCEDURE — 99285 EMERGENCY DEPT VISIT HI MDM: CPT | Mod: 25

## 2020-12-28 PROCEDURE — 83735 ASSAY OF MAGNESIUM: CPT

## 2020-12-28 PROCEDURE — 36415 COLL VENOUS BLD VENIPUNCTURE: CPT

## 2020-12-28 PROCEDURE — 83036 HEMOGLOBIN GLYCOSYLATED A1C: CPT

## 2020-12-28 PROCEDURE — 82550 ASSAY OF CK (CPK): CPT

## 2020-12-28 PROCEDURE — 80053 COMPREHEN METABOLIC PANEL: CPT

## 2020-12-28 PROCEDURE — 97535 SELF CARE MNGMENT TRAINING: CPT

## 2020-12-28 PROCEDURE — 97110 THERAPEUTIC EXERCISES: CPT

## 2020-12-28 PROCEDURE — 97167 OT EVAL HIGH COMPLEX 60 MIN: CPT

## 2020-12-28 PROCEDURE — 80061 LIPID PANEL: CPT

## 2020-12-28 PROCEDURE — 71045 X-RAY EXAM CHEST 1 VIEW: CPT

## 2020-12-28 PROCEDURE — 80048 BASIC METABOLIC PNL TOTAL CA: CPT

## 2020-12-28 PROCEDURE — 97116 GAIT TRAINING THERAPY: CPT

## 2020-12-28 PROCEDURE — 97530 THERAPEUTIC ACTIVITIES: CPT

## 2020-12-28 PROCEDURE — 86769 SARS-COV-2 COVID-19 ANTIBODY: CPT

## 2023-12-25 ENCOUNTER — EMERGENCY (EMERGENCY)
Facility: HOSPITAL | Age: 88
LOS: 1 days | End: 2023-12-25
Attending: EMERGENCY MEDICINE
Payer: MEDICARE

## 2023-12-25 PROCEDURE — C1751: CPT

## 2023-12-25 PROCEDURE — 99291 CRITICAL CARE FIRST HOUR: CPT | Mod: FT,25

## 2023-12-25 PROCEDURE — 99291 CRITICAL CARE FIRST HOUR: CPT

## 2023-12-25 PROCEDURE — 36556 INSERT NON-TUNNEL CV CATH: CPT | Mod: RT

## 2023-12-25 PROCEDURE — 36556 INSERT NON-TUNNEL CV CATH: CPT

## 2023-12-25 NOTE — ED ADULT TRIAGE NOTE - NURSING HOMES
[facility] [place] [facility] [place]

## 2023-12-25 NOTE — ED ADULT NURSE NOTE - CHIEF COMPLAINT QUOTE
BIBA from [facility] after being found unresponsive by staff, unknown downtime. patient wit chronic trach and g-tube in place. patient HR found to be 30 by EMS. arrived to ED and met by dr. deutsch, patient lost pulse at 05:33, CPR started and code blue activated. BIBA from [facility] after being found unresponsive by staff, unknown downtime. patient wit chronic trach and g-tube in place. patient HR found to be 30 by EMS. arrived to ED and met by dr. deutsch, patient lost pulse at 05:33, CPR started and code blue activated.

## 2023-12-25 NOTE — ED ADULT TRIAGE NOTE - CHIEF COMPLAINT QUOTE
BIBA from [facility] after being found unresponsive by staff, unknown downtime. patient wit chronic trach and g-tube in place. patient HR found to be 30 by EMS. arrived to ED and met by dr. deutsch, patient lost pulse at 05:33, CPR started and code blue activated. BIBA from [facility] after being found unresponsive by staff, unknown downtime. patient wit chronic trach and g-tube in place. patient HR found to be 30 by EMS. arrived to ED and met by dr. deutsch, patient lost pulse at 05:33, CPR started and code blue activated.

## 2023-12-25 NOTE — ED PROVIDER NOTE - OBJECTIVE STATEMENT
89-year-old female; PMH significant for A-fib , HTN, HLD, recent CVA; now presenting with bradycardia and cardiac arrest upon arrival.  As per EMS patient with sudden onset bradycardia at facility with increasing oxygen requirement.  Upon arrival to ED patient bradycardic in the low 30s and went into cardiac arrest immediately upon arrival.

## 2023-12-25 NOTE — ED PROVIDER NOTE - PROGRESS NOTE DETAILS
patient asystolic, no pulse, no cardiac activity on bedside US.  patient continued to be monitored for 15min with no rosc.  time of death called at 0650. patient with asystole, no pulse, no cardiac activity on bedside US. after 3min of asystole, patient again with spontaneous respiration and bradycardia. resuscitation efforts again restarted.

## 2023-12-25 NOTE — ED PROVIDER NOTE - CLINICAL SUMMARY MEDICAL DECISION MAKING FREE TEXT BOX
89-year-old female; PMH significant for A-fib , HTN, HLD, recent CVA; now presenting with bradycardia and cardiac arrest upon arrival.  Patient with prolonged resuscitative efforts including being started on amio drips, Levophed, femoral line placed for resuscitation and placed on vent.  Patient with bradycardia and subsequent asystole.  Patient had brief episode of spontaneous respirations and ROSC but then again went into asystole.  Time of death was called at 0650.  Family notified.  Clergy offered but declined.

## 2023-12-25 NOTE — ED PROVIDER NOTE - ATTENDING CONTRIBUTION TO CARE
Upon my evaluation, this patient had a high probability of imminent or life-threatening deterioration due to _CARDIAC ARREST__ , which required my direct attention, intervention, and personal management.    I have personally provided __40 minutes of critical care time exclusive of time spent on separately billable procedures.  Time includes review of laboratory data, radiology results, discussion with consultants, and monitoring for potential decompensation.  Interventions were performed as documented.

## 2023-12-25 NOTE — ED PROVIDER NOTE - PHYSICAL EXAMINATION
Gen: unresponsvie  Head: NC, AT, PERRL, EOMI, normal lids/conjunctiva  Neck: trach in place, midline  Pulm: bilateral bs @ ambu-ventilation  CV: no pulse  Abd: soft, NT/ND  Skin: no rash, warm, dry  Neuro: unresponsive

## 2024-06-10 NOTE — PATIENT PROFILE ADULT - MEDICATIONS/VISITS
no Bed/Stretcher in lowest position, wheels locked, appropriate side rails in place/Call bell, personal items and telephone in reach/Instruct patient to call for assistance before getting out of bed/chair/stretcher/Non-slip footwear applied when patient is off stretcher/[place] to call system/Physically safe environment - no spills, clutter or unnecessary equipment/Purposeful proactive rounding/Room/bathroom lighting operational, light cord in reach

## 2025-06-26 NOTE — OCCUPATIONAL THERAPY INITIAL EVALUATION ADULT - UPPER BODY DRESSING, PREVIOUS LEVEL OF FUNCTION, OT EVAL
independent
PAST MEDICAL HISTORY:  FIONA positive     Elevated platelet count     GERD (gastroesophageal reflux disease)     Herniation of intervertebral disc of lumbar region     Hiatal hernia     Morbid obesity     Obstructive sleep apnea on CPAP     Osteoarthritis     Ovarian cyst     Tendonitis of left hip

## 2025-07-01 NOTE — ED PROVIDER NOTE - MDM PATIENT STATEMENT FOR ADDL TREATMENT
NAME:  Tamara Martinez   :   1958   MRN:   [id number]     Date/Time:  2025 10:57 AM  Subjective: for MWV,f/u DM2 ,Polyneuropathy,Chol,Cirrhosis.Feeling well   HPI   Diabetes   The history is provided by the Patient. This is a chronic problem. The problem is unchanged  Hypertension  This is a chronic problem. The problem is unchanged. The problem is controlled. Associated symptoms include malaise/fatigue. Pertinent negatives include no anxiety, chest pain, palpitations, peripheral edema or shortness of breath. There is no history of chronic renal disease.   Hyperlipidemia  This is a chronic problem. The problem is controlled. Recent lipid tests were reviewed and are normal. She has no history of chronic renal disease or obesity. Associated symptoms include myalgias. Pertinent negatives include no chest pain or shortness of breath. The current treatment provides moderate improvement of lipids.      Review of Systems   Constitutional:  Negative for fatigue.   HENT:  Negative for congestion, dental problem and postnasal drip.    Cardiovascular:  Negative for chest pain, palpitations and leg swelling.   Gastrointestinal:  Negative for abdominal distention, abdominal pain, anal bleeding and blood in stool.   Genitourinary:  Negative for difficulty urinating.   Musculoskeletal:  Negative for arthralgias.   Neurological:  Negative for dizziness.   Psychiatric/Behavioral:  Negative for agitation.                Medications reviewed:  Current Outpatient Medications   Medication Sig    glipiZIDE (GLUCOTROL) 10 MG tablet Take 1 tablet by mouth 2 times daily    metFORMIN (GLUCOPHAGE-XR) 500 MG extended release tablet Take 2 tablets orally in the morning and 1 tablet orally in the evening with meals    gabapentin (NEURONTIN) 300 MG capsule Take 1 capsule by mouth 3 times daily for 180 days. Max Daily Amount: 900 mg    pravastatin (PRAVACHOL) 40 MG tablet Take 1 tablet by mouth daily    ondansetron (ZOFRAN-ODT) 
Chief Complaint   Patient presents with    Medicare AWV     Patient is here today for her medicare annual wellness exam.      \"Have you been to the ER, urgent care clinic since your last visit?  Hospitalized since your last visit?\"    NO    “Have you seen or consulted any other health care providers outside of [facility] since your last visit?”    Eye Doctor    Have you had a mammogram?”   NO    Date of last Mammogram: 8/2/2018             Click Here for Release of Records Request    
Patient with one or more new problems requiring additional work-up/treatment.